# Patient Record
Sex: MALE | Race: WHITE | NOT HISPANIC OR LATINO | Employment: FULL TIME | URBAN - METROPOLITAN AREA
[De-identification: names, ages, dates, MRNs, and addresses within clinical notes are randomized per-mention and may not be internally consistent; named-entity substitution may affect disease eponyms.]

---

## 2019-05-17 ENCOUNTER — HOSPITAL ENCOUNTER (INPATIENT)
Facility: HOSPITAL | Age: 44
LOS: 5 days | Discharge: HOME | DRG: 309 | End: 2019-05-22
Attending: EMERGENCY MEDICINE | Admitting: INTERNAL MEDICINE
Payer: COMMERCIAL

## 2019-05-17 DIAGNOSIS — I48.91 ATRIAL FIBRILLATION WITH RVR: Primary | ICD-10-CM

## 2019-05-17 DIAGNOSIS — Z91.199 PATIENT'S NONCOMPLIANCE WITH OTHER MEDICAL TREATMENT AND REGIMEN: ICD-10-CM

## 2019-05-17 DIAGNOSIS — I48.91 A-FIB: ICD-10-CM

## 2019-05-17 PROBLEM — I10 ESSENTIAL HYPERTENSION: Status: ACTIVE | Noted: 2019-05-17

## 2019-05-17 PROBLEM — E66.01 MORBID OBESITY: Status: ACTIVE | Noted: 2019-05-17

## 2019-05-17 PROBLEM — E03.9 HYPOTHYROIDISM: Status: ACTIVE | Noted: 2019-05-17

## 2019-05-17 LAB
ANION GAP SERPL CALC-SCNC: 14 MMOL/L (ref 8–16)
BASOPHILS # BLD AUTO: 0 K/UL (ref 0–0.2)
BASOPHILS NFR BLD: 0.4 % (ref 0–1.9)
BUN SERPL-MCNC: 13 MG/DL (ref 6–20)
CALCIUM SERPL-MCNC: 9.4 MG/DL (ref 8.7–10.5)
CHLORIDE SERPL-SCNC: 101 MMOL/L (ref 95–110)
CK MB SERPL-MCNC: 0.9 NG/ML (ref 0.1–6.5)
CK MB SERPL-MCNC: 1 NG/ML (ref 0.1–6.5)
CK MB SERPL-RTO: 1 % (ref 0–5)
CK MB SERPL-RTO: 1.1 % (ref 0–5)
CK SERPL-CCNC: 91 U/L (ref 20–200)
CK SERPL-CCNC: 91 U/L (ref 20–200)
CO2 SERPL-SCNC: 24 MMOL/L (ref 23–29)
CREAT SERPL-MCNC: 0.9 MG/DL (ref 0.5–1.4)
D DIMER PPP IA.FEU-MCNC: 2.93 MG/L FEU
DIFFERENTIAL METHOD: ABNORMAL
EOSINOPHIL # BLD AUTO: 0.1 K/UL (ref 0–0.5)
EOSINOPHIL NFR BLD: 0.6 % (ref 0–8)
ERYTHROCYTE [DISTWIDTH] IN BLOOD BY AUTOMATED COUNT: 13.4 % (ref 11.5–14.5)
EST. GFR  (AFRICAN AMERICAN): >60 ML/MIN/1.73 M^2
EST. GFR  (NON AFRICAN AMERICAN): >60 ML/MIN/1.73 M^2
GLUCOSE SERPL-MCNC: 150 MG/DL (ref 70–110)
HCT VFR BLD AUTO: 46.3 % (ref 40–54)
HGB BLD-MCNC: 15.1 G/DL (ref 14–18)
LACTATE SERPL-SCNC: 1.7 MMOL/L (ref 0.5–2.2)
LYMPHOCYTES # BLD AUTO: 1.6 K/UL (ref 1–4.8)
LYMPHOCYTES NFR BLD: 13.4 % (ref 18–48)
MCH RBC QN AUTO: 31 PG (ref 27–31)
MCHC RBC AUTO-ENTMCNC: 32.7 G/DL (ref 32–36)
MCV RBC AUTO: 95 FL (ref 82–98)
MONOCYTES # BLD AUTO: 1.3 K/UL (ref 0.3–1)
MONOCYTES NFR BLD: 10.4 % (ref 4–15)
NEUTROPHILS # BLD AUTO: 9.2 K/UL (ref 1.8–7.7)
NEUTROPHILS NFR BLD: 75.2 % (ref 38–73)
PLATELET # BLD AUTO: 332 K/UL (ref 150–350)
PMV BLD AUTO: 9.1 FL (ref 9.2–12.9)
POTASSIUM SERPL-SCNC: 3.9 MMOL/L (ref 3.5–5.1)
RBC # BLD AUTO: 4.88 M/UL (ref 4.6–6.2)
SODIUM SERPL-SCNC: 139 MMOL/L (ref 136–145)
T4 FREE SERPL-MCNC: 0.8 NG/DL (ref 0.71–1.51)
TROPONIN I SERPL DL<=0.01 NG/ML-MCNC: 0.01 NG/ML (ref 0–0.03)
TROPONIN I SERPL DL<=0.01 NG/ML-MCNC: 0.02 NG/ML (ref 0–0.03)
TROPONIN I SERPL DL<=0.01 NG/ML-MCNC: 0.02 NG/ML (ref 0–0.03)
TSH SERPL DL<=0.005 MIU/L-ACNC: 23.74 UIU/ML (ref 0.4–4)
WBC # BLD AUTO: 12.3 K/UL (ref 3.9–12.7)

## 2019-05-17 PROCEDURE — 82553 CREATINE MB FRACTION: CPT

## 2019-05-17 PROCEDURE — 83605 ASSAY OF LACTIC ACID: CPT

## 2019-05-17 PROCEDURE — 25000003 PHARM REV CODE 250: Performed by: INTERNAL MEDICINE

## 2019-05-17 PROCEDURE — 84439 ASSAY OF FREE THYROXINE: CPT

## 2019-05-17 PROCEDURE — 96376 TX/PRO/DX INJ SAME DRUG ADON: CPT

## 2019-05-17 PROCEDURE — 36415 COLL VENOUS BLD VENIPUNCTURE: CPT

## 2019-05-17 PROCEDURE — 96365 THER/PROPH/DIAG IV INF INIT: CPT

## 2019-05-17 PROCEDURE — 82550 ASSAY OF CK (CPK): CPT | Mod: 91

## 2019-05-17 PROCEDURE — 84443 ASSAY THYROID STIM HORMONE: CPT

## 2019-05-17 PROCEDURE — 63600175 PHARM REV CODE 636 W HCPCS: Performed by: EMERGENCY MEDICINE

## 2019-05-17 PROCEDURE — 93005 ELECTROCARDIOGRAM TRACING: CPT

## 2019-05-17 PROCEDURE — 25500020 PHARM REV CODE 255: Performed by: EMERGENCY MEDICINE

## 2019-05-17 PROCEDURE — 85025 COMPLETE CBC W/AUTO DIFF WBC: CPT

## 2019-05-17 PROCEDURE — 84484 ASSAY OF TROPONIN QUANT: CPT | Mod: 91

## 2019-05-17 PROCEDURE — 96372 THER/PROPH/DIAG INJ SC/IM: CPT | Mod: 59

## 2019-05-17 PROCEDURE — 11000001 HC ACUTE MED/SURG PRIVATE ROOM

## 2019-05-17 PROCEDURE — 25000003 PHARM REV CODE 250: Performed by: EMERGENCY MEDICINE

## 2019-05-17 PROCEDURE — 99285 EMERGENCY DEPT VISIT HI MDM: CPT | Mod: 25

## 2019-05-17 PROCEDURE — 84484 ASSAY OF TROPONIN QUANT: CPT

## 2019-05-17 PROCEDURE — 85379 FIBRIN DEGRADATION QUANT: CPT

## 2019-05-17 PROCEDURE — 80048 BASIC METABOLIC PNL TOTAL CA: CPT

## 2019-05-17 PROCEDURE — 96366 THER/PROPH/DIAG IV INF ADDON: CPT

## 2019-05-17 RX ORDER — DILTIAZEM HCL 1 MG/ML
10 INJECTION, SOLUTION INTRAVENOUS CONTINUOUS
Status: DISCONTINUED | OUTPATIENT
Start: 2019-05-17 | End: 2019-05-17

## 2019-05-17 RX ORDER — CARVEDILOL 3.12 MG/1
3.12 TABLET ORAL 2 TIMES DAILY
Status: DISCONTINUED | OUTPATIENT
Start: 2019-05-17 | End: 2019-05-19

## 2019-05-17 RX ORDER — DILTIAZEM HYDROCHLORIDE 5 MG/ML
10 INJECTION INTRAVENOUS
Status: COMPLETED | OUTPATIENT
Start: 2019-05-17 | End: 2019-05-17

## 2019-05-17 RX ORDER — LEVOTHYROXINE SODIUM 25 UG/1
25 TABLET ORAL
Status: DISCONTINUED | OUTPATIENT
Start: 2019-05-18 | End: 2019-05-22 | Stop reason: HOSPADM

## 2019-05-17 RX ORDER — ENOXAPARIN SODIUM 150 MG/ML
120 INJECTION SUBCUTANEOUS
Status: COMPLETED | OUTPATIENT
Start: 2019-05-17 | End: 2019-05-17

## 2019-05-17 RX ORDER — SODIUM CHLORIDE 0.9 % (FLUSH) 0.9 %
10 SYRINGE (ML) INJECTION
Status: DISCONTINUED | OUTPATIENT
Start: 2019-05-17 | End: 2019-05-22 | Stop reason: HOSPADM

## 2019-05-17 RX ORDER — ENOXAPARIN SODIUM 150 MG/ML
1 INJECTION SUBCUTANEOUS
Status: DISCONTINUED | OUTPATIENT
Start: 2019-05-17 | End: 2019-05-17

## 2019-05-17 RX ORDER — DILTIAZEM HCL/D5W 125 MG/125
10 PLASTIC BAG, INJECTION (ML) INTRAVENOUS CONTINUOUS
Status: DISCONTINUED | OUTPATIENT
Start: 2019-05-17 | End: 2019-05-18

## 2019-05-17 RX ORDER — ENOXAPARIN SODIUM 150 MG/ML
1 INJECTION SUBCUTANEOUS
Status: DISCONTINUED | OUTPATIENT
Start: 2019-05-18 | End: 2019-05-18

## 2019-05-17 RX ADMIN — SODIUM CHLORIDE, SODIUM LACTATE, POTASSIUM CHLORIDE, AND CALCIUM CHLORIDE 1000 ML: .6; .31; .03; .02 INJECTION, SOLUTION INTRAVENOUS at 02:05

## 2019-05-17 RX ADMIN — DILTIAZEM HYDROCHLORIDE 10 MG: 5 INJECTION INTRAVENOUS at 02:05

## 2019-05-17 RX ADMIN — ENOXAPARIN SODIUM 120 MG: 150 INJECTION SUBCUTANEOUS at 04:05

## 2019-05-17 RX ADMIN — IOHEXOL 100 ML: 350 INJECTION, SOLUTION INTRAVENOUS at 02:05

## 2019-05-17 RX ADMIN — DILTIAZEM HYDROCHLORIDE 10 MG/HR: 5 INJECTION INTRAVENOUS at 02:05

## 2019-05-17 RX ADMIN — CARVEDILOL 3.12 MG: 3.12 TABLET, FILM COATED ORAL at 08:05

## 2019-05-17 NOTE — ED PROVIDER NOTES
"Encounter Date: 5/17/2019    SCRIBE #1 NOTE: I, Shiloh Clifford, am scribing for, and in the presence of, Dr. Elmer Poon.       History     Chief Complaint   Patient presents with    Palpitations       Time seen by provider: 12:39 PM on 05/17/2019    Bernard Tinajero is a 43 y.o. male with PMHx of HTN and hypothyroidism who presents to the ED with complaints of palpitations that started last night. Patient mentions currently having increased anxiety. He reports having a cough that is non productive and also started last night. He was prompted to visit the ED due to concerns of possible PNA when "hearing a popping noise when I exhaled". The patient reports having a fever and chills last night but denies measuring for a fever. He is on HTN medications but is non compliant. He complains of chronic left leg swelling and notes having cellulitis of the left lower leg in the past. He also notes having a chronic rash for the past x2 years on his left lower leg and has dermatology in the past for it. He is currently using an antifungal essential oils cream for the rash. The patient denies recent surgeries or prolonged travelling trips. He denies blood or dark stool, blood in the urine, chest pain, SOB, or onset of any other new symptoms currently. No SHx on file. Ampicillin allergy noted.     The history is provided by the patient.     Review of patient's allergies indicates:   Allergen Reactions    Ampicillin      No past medical history on file.  No past surgical history on file.  No family history on file.  Social History     Tobacco Use    Smoking status: Not on file   Substance Use Topics    Alcohol use: Not on file    Drug use: Not on file     Review of Systems   Constitutional: Positive for chills and fever (subjective). Negative for activity change.   HENT: Negative for congestion and rhinorrhea.    Respiratory: Positive for cough (nonproductive).    Cardiovascular: Positive for palpitations and leg swelling " (left leg; chronic). Negative for chest pain.   Gastrointestinal: Negative for abdominal pain, constipation, diarrhea, nausea and vomiting.   Genitourinary: Negative for difficulty urinating and hematuria.   Musculoskeletal: Negative for arthralgias, gait problem and myalgias.   Skin: Positive for rash (LLE). Negative for pallor.   Neurological: Negative for dizziness, syncope, weakness, light-headedness and headaches.   Hematological: Does not bruise/bleed easily.   Psychiatric/Behavioral: The patient is nervous/anxious.        Physical Exam     Initial Vitals [05/17/19 1222]   BP Pulse Resp Temp SpO2   122/81 (!) 168 16 99.3 °F (37.4 °C) 99 %      MAP       --         Physical Exam    Nursing note and vitals reviewed.  Constitutional: He appears well-developed and well-nourished. He is not diaphoretic. No distress.   HENT:   Head: Normocephalic and atraumatic.   Mouth/Throat: Oropharynx is clear and moist.   Eyes: Conjunctivae are normal.   Neck: Neck supple.   Cardiovascular: Normal heart sounds and intact distal pulses. An irregular rhythm present.  Tachycardia present.  Exam reveals no gallop and no friction rub.    No murmur heard.  Tachycardic rate with an irregular rhythm. No murmurs, rubs, or gallops noted.    Pulmonary/Chest: Breath sounds normal. No accessory muscle usage. No respiratory distress. He has no wheezes. He has no rhonchi. He has no rales.   Equal, bilateral breath sounds noted without wheezing.    Abdominal: Soft. He exhibits no distension. There is no tenderness.   Musculoskeletal: Normal range of motion.   No trace pitting edema. BLE are symmetrical. Non tender plaque to the medial left lower extremity with superficial skin erosion. No petechia or purpura noted.    Neurological: He is alert and oriented to person, place, and time.   Skin: No petechiae and no purpura noted. No erythema.         ED Course   Critical Care  Date/Time: 5/17/2019 8:36 PM  Performed by: Elmer Poon,  MD  Authorized by: Rika Lamb MD   Direct patient critical care time: 15 minutes  Additional history critical care time: 2 minutes  Ordering / reviewing critical care time: 5 minutes  Documentation critical care time: 6 minutes  Consulting other physicians critical care time: 3 minutes  Total critical care time (exclusive of procedural time) : 31 minutes        Labs Reviewed   CBC W/ AUTO DIFFERENTIAL - Abnormal; Notable for the following components:       Result Value    MPV 9.1 (*)     Gran # (ANC) 9.2 (*)     Mono # 1.3 (*)     Gran% 75.2 (*)     Lymph% 13.4 (*)     All other components within normal limits   BASIC METABOLIC PANEL - Abnormal; Notable for the following components:    Glucose 150 (*)     All other components within normal limits   D DIMER, QUANTITATIVE - Abnormal; Notable for the following components:    D-Dimer 2.93 (*)     All other components within normal limits   TSH - Abnormal; Notable for the following components:    TSH 23.739 (*)     All other components within normal limits   LACTIC ACID, PLASMA   TROPONIN I   T4, FREE          Imaging Results          CTA Chest Non-Coronary (Final result)  Result time 05/17/19 14:59:37    Final result by Jose Manuel Jerome MD (05/17/19 14:59:37)                 Impression:      Cardiomegaly and features of mild pulmonary edema.  Small bilateral pleural effusions.    Mild nonspecific mediastinal and left hilar lymphadenopathy.    Partially calcified nodule within the lingula.  This may represent a calcifying granuloma however follow-up in 3-6 months is recommended due to the size and soft tissue component.      Electronically signed by: Jose Manuel Jerome MD  Date:    05/17/2019  Time:    14:59             Narrative:    EXAMINATION:  CTA CHEST NON CORONARY    CLINICAL HISTORY:  Chest pain, acute, PE suspected, intermed prob, positive D-dimer;    TECHNIQUE:  Low dose axial images, sagittal and coronal reformations were obtained from the thoracic inlet  to the lung bases following the IV administration of 100 mL of Omnipaque 350.  Contrast timing was optimized to evaluate the pulmonary arteries.  MIP images were performed.    COMPARISON:  None    FINDINGS:  There are no pulmonary arterial filling defects to indicate the presence of pulmonary thromboemboli.    The heart size is mildly enlarged.  There is mild mediastinal and left hilar lymphadenopathy.  This is manifest predominantly as an abundance of nonenlarged mediastinal lymph nodes however also with a few frankly enlarged nodes including a 13 mm short axis AP window lymph node and a 12 mm short axis left hilar lymph node.    There are small bilateral pleural effusions.  Geographic ground-glass changes are present most notably at the lung bases.  These are accompanied by mild septal thickening.  There is no consolidation.    There is a 1.8 cm nodule within the lingula containing a central coarse calcification.                               X-Ray Chest 1 View (Final result)  Result time 05/17/19 13:17:34    Final result by Lydia Naqvi MD (05/17/19 13:17:34)                 Impression:      Mildly prominent infrahilar markings on the right which could represent mild strandy infiltrate or atelectasis without confluent infiltrate.      Electronically signed by: Lydia Naqvi MD  Date:    05/17/2019  Time:    13:17             Narrative:    EXAMINATION:  XR CHEST 1 VIEW    CLINICAL HISTORY:  Pleuritic back pain, r/o pneumonia;    TECHNIQUE:  Single frontal view of the chest was performed.    COMPARISON:  None    FINDINGS:  The heart appears borderline to mildly enlarged for the projection.  There are mildly prominent markings right infrahilar.  There is no consolidation.  There is no pleural effusion.    There are no prior studies for comparison                            (radiology reading, CXR visualized by me)    The patient was informed of the incidental finding(s) as well as the need for PCP or  specialist follow-up for reevaluation and possible further investigation or monitoring.     Medical Decision Making:   History:   Old Medical Records: I decided to obtain old medical records.  Clinical Tests:   Lab Tests: Reviewed and Ordered  Radiological Study: Reviewed and Ordered  Medical Tests: Reviewed and Ordered  Other:   I have discussed this case with another health care provider.            Scribe Attestation:   Scribe #1: I performed the above scribed service and the documentation accurately describes the services I performed. I attest to the accuracy of the note.      I, Dr. Elmer Poon, personally performed the services described in this documentation. All medical record entries made by the scribe were at my direction and in my presence.  I have reviewed the chart and agree that the record reflects my personal performance and is accurate and complete. Elmer Poon MD.  8:34 PM 05/17/2019    Bernard Tinajero is a 43 y.o. male presenting with  Tachycardia with atrial fibrillation with rapid ventricular response.  This responded well to IV diltiazem bolus and infusion with moderation of heart rate.  He has had no respiratory distress or other issues here.  He did have some vague pleuritic back pain with workup ultimately negative for acute process such as pneumonia or PE based on CT.  I do not think antibiotics are indicated.  I doubt sepsis.  Lactic acid is normal.  He does have mild edema visible primarily on CT.  Further IV fluids were discontinued initiated due to IV dye administration.  Further workup for possible associated mild CHF to be considered.  I have discussed with hospital medicine will assume care.  Anticoagulation initiated in consideration of the primary AFib as requested by Dr. Lamb.            ED Course as of May 17 1518   Fri May 17, 2019   1250 EKG:  Atrial fibrillation with RVR, rate of 187, normal intervals and axis.  There are no acute ST or T wave changes suggestive  of acute ischemia or infarction.      [MR]   1433 HR improved to 120s on diltiazem bolus followed by gtt ongoing.    [MR]      ED Course User Index  [MR] Elmer Poon MD     Clinical Impression:       ICD-10-CM ICD-9-CM   1. Atrial fibrillation with RVR I48.91 427.31         Disposition:   Disposition: Admitted                        Elmer Poon MD  05/17/19 2037

## 2019-05-17 NOTE — ASSESSMENT & PLAN NOTE
Body mass index is 46.05 kg/m². Morbid obesity complicates all aspects of disease management from diagnostic modalities to treatment. Weight loss encouraged and health benefits explained to patient.

## 2019-05-17 NOTE — ASSESSMENT & PLAN NOTE
Chronic, controlled.  Latest blood pressure and vitals reviewed-   Temp:  [96 °F (35.6 °C)-98.4 °F (36.9 °C)]   Pulse:  []   Resp:  [16-20]   BP: ()/(56-95)   SpO2:  [96 %-98 %] .   Current meds for hypertension include   Carvedilol, diltiazem.  Will continue BP medications as needed for sustained BP control.

## 2019-05-17 NOTE — ASSESSMENT & PLAN NOTE
Patient admits prior history of hypothyroidism but never took the medication.  Start Synthroid at lower dose 25 mcg daily.  Avoiding higher does due to atrial fibrillation with rapid ventricular response.

## 2019-05-17 NOTE — ASSESSMENT & PLAN NOTE
Atrial Fibrillation controlled currently with Beta Blocker and Calcium Channel Blocker. [id number] score 1. Anticoagulation indicated currently. Anticoagulation done with   Aspirin given the patient's low chads 2 score. Will transition to IV Cardizem drip to p.o. Cardizem / beta-blocker and monitor.  Cardiology consulted, will defer to them for further management.

## 2019-05-17 NOTE — H&P
PCP: Primary Doctor No    History & Physical    Chief Complaint:  Palpitations for 1 day    History of Present Illness:  Patient is a 43 y.o. male admitted to Hospitalist Service from [facility] Room with complaint of palpitations for 1 day. Patient reportedly has past medical history significant for morbid obesity, hypertension, hypothyroidism, chronic left leg swelling with history of leg cellulitis/dermatitis and medical noncompliance.  Patient presented to the emergency room with complaint of palpitations since previous night.  Patient reports associated nonproductive cough with increased anxiety.  Patient is worried about possibility of pneumonia.  Patient is describing frequent wheezing and reported subjective fever for 1 day as well.  Patient denied any orthopnea or paroxysmal nocturnal dyspnea.  No calf tenderness reported.  Patient denied any use of illicit substances.  No recent travel history or sick contact reported. Patient denied chest pain, abdominal pain, nausea, vomiting, headache, vision changes, focal neuro-deficits, cough or fever.  In the emergency room patient is noted to have new onset atrial fibrillation with rapid ventricular response around 120 beats per minute for which patient has been started on Cardizem infusion at 10 milligram/hour after receiving a bolus of 10 mg Cardizem injection.     Past medical history:  Morbid obesity, chronic left leg swelling, hypothyroidism, hypertension  No past surgical history on file.     Family history:  Denies family history of heart disease or cerebrovascular accident.    Social history:  , retired [employer], admits drinking alcohol 3 pt weekly.  Denies cigarette smoking.    Review of patient's allergies indicates:   Allergen Reactions    Ampicillin        (Not in a hospital admission)  Review of Systems:  Constitutional: no fever or chills  Eyes: no visual changes  Ears, nose, mouth, throat, and face: no nasal  congestion or sore throat  Respiratory: no cough or shorness of breath  Cardiovascular: no chest pain or palpitations  Gastrointestinal: no nausea or vomiting, no abdominal pain or change in bowel habits  Genitourinary: no hematuria or dysuria  Integument/breast: no rash or pruritis  Hematologic/lymphatic: no easy bruising or lymphadenopathy  Musculoskeletal: no arthralgias or myalgias  Neurological: no seizures or tremors.  Behavioral/Psych: no auditory or visual hallucinations  Endocrine: no heat or cold intolerance     OBJECTIVE:     Vital Signs (Most Recent)  Temp: 99.3 °F (37.4 °C) (05/17/19 1222)  Pulse: (!) 134 (05/17/19 1524)  Resp: 16 (05/17/19 1222)  BP: (!) 147/100 (05/17/19 1502)  SpO2: 97 % (05/17/19 1524)    Physical Exam:  General appearance: well developed, appears stated age, morbidly obese male, pleasant  Head: normocephalic, atraumatic  Eyes:  conjunctivae/corneas clear. PERRL.  Nose: Nares normal. Septum midline.  Throat: lips, mucosa, and tongue normal; teeth and gums normal, no throat erythema.  Neck: supple, symmetrical, trachea midline, no JVD and thyroid not enlarged, symmetric, no tenderness/mass/nodules  Lungs:  clear to auscultation bilaterally and normal respiratory effort  Chest wall: no tenderness  Heart:  Tachycardia, irregular, S1, S2 normal, no murmur, click, rub or gallop  Abdomen: soft, non-tender non-distended; bowel sounds normal; no masses,  no organomegaly  Extremities: no cyanosis, clubbing or edema. Bilateral Tamica sign negative.  Left lower extremity has superficial skin erosions and plaque formation on the medial aspect of the left leg.  Pulses: 2+ and symmetric  Skin: Skin color, texture, turgor normal. No rashes or lesions.  Lymph nodes: Cervical, supraclavicular, and axillary nodes normal.  Neurologic: Normal strength and tone. No focal numbness or weakness. CNII-XII intact.      Laboratory:   CBC:   Recent Labs   Lab 05/17/19  1235   WBC 12.30   RBC 4.88   HGB 15.1    HCT 46.3      MCV 95   MCH 31.0   MCHC 32.7     CMP:   Recent Labs   Lab 05/17/19  1235   *   CALCIUM 9.4      K 3.9   CO2 24      BUN 13   CREATININE 0.9   Cardiac markers:   Recent Labs   Lab 05/17/19  1235   TROPONINI 0.014   D-dimer: 2.93    TSH: 23.7  Free T4: 0.80    Diagnostic Results:  Chest X-Ray: Mildly prominent infrahilar markings on the right which could represent mild strandy infiltrate or atelectasis without confluent infiltrate.    CTA chest non-coronary study:  Cardiomegaly and features of mild pulmonary edema.  Small bilateral pleural effusions. Mild nonspecific mediastinal and left hilar lymphadenopathy. Partially calcified nodule within the lingula.  This may represent a calcifying granuloma however follow-up in 3-6 months is recommended due to the size and soft tissue component.    ECHO: Not available in our system.    Assessment/Plan:     Active Hospital Problems    Diagnosis  POA    *Atrial fibrillation with RVR [I48.91]  Supplemental O2 via nasal canula; titrate O2 saturation to >92%.  Serial Cardiac enzymes × 3.  Obtain 2D echocardiogram for evaluation of LV systolic function, valvular heart disease or intracardiac thrombus formation.  Continue Cardizem infusion to regulate heart rate.  Lovenox 1 milligram/kilogram subcu q.12 hours  Tele-monitoring.   Cardiology Consultation.  Check fasting lipid panel and EKG in AM.  Use Nitroglycerine PRN Chest pain.  Na restriction <2g/d.    Alcohol abuse  Patient counseled regarding abstinence from alcohol drinking especially in the setting of atrial fibrillation with rapid ventricular response.  Patient voiced understanding.   to provide alcohol rehab resources    Yes    Morbid obesity [E66.01]  Body mass index is 46.05 kg/m². Morbid obesity complicates all aspects of disease management from diagnostic modalities to treatment. Weight loss encouraged and health benefits explained to patient.    Yes     Hypothyroidism [E03.9]  Patient admits prior history of hypothyroidism but never took the medication.  Start Synthroid at lower dose 25 mcg daily.  Avoiding higher does due to atrial fibrillation with rapid ventricular response.    Yes    Patient's noncompliance with other medical treatment and regimen [Z91.19]  Medical compliance counseling performed.    Not Applicable    Essential hypertension [I10]  Start Coreg 3.125 mg twice daily.  Monitor BP closley.  Unknown     DVT prophylaxis:  Lovenox 1 milligram/kilogram subcu q.12 hours.    Discussed with patient's wife.  Answered all questions.    Rika Lamb MD  Department of Hospital Medicine   [facility]

## 2019-05-18 LAB
ALBUMIN SERPL BCP-MCNC: 3.4 G/DL (ref 3.5–5.2)
ALP SERPL-CCNC: 57 U/L (ref 55–135)
ALT SERPL W/O P-5'-P-CCNC: 37 U/L (ref 10–44)
ANION GAP SERPL CALC-SCNC: 7 MMOL/L (ref 8–16)
AORTIC ROOT ANNULUS: 2.72 CM
AORTIC VALVE CUSP SEPERATION: 2.06 CM
AST SERPL-CCNC: 29 U/L (ref 10–40)
AV INDEX (PROSTH): 0.87
AV MEAN GRADIENT: 3.59 MMHG
AV PEAK GRADIENT: 5.95 MMHG
AV VALVE AREA: 3.42 CM2
AV VELOCITY RATIO: 0.81
BASOPHILS # BLD AUTO: 0 K/UL (ref 0–0.2)
BASOPHILS NFR BLD: 0.4 % (ref 0–1.9)
BILIRUB SERPL-MCNC: 1.3 MG/DL (ref 0.1–1)
BSA FOR ECHO PROCEDURE: 2.51 M2
BUN SERPL-MCNC: 10 MG/DL (ref 6–20)
CALCIUM SERPL-MCNC: 9.3 MG/DL (ref 8.7–10.5)
CHLORIDE SERPL-SCNC: 103 MMOL/L (ref 95–110)
CHOLEST SERPL-MCNC: 184 MG/DL (ref 120–199)
CHOLEST/HDLC SERPL: 6.8 {RATIO} (ref 2–5)
CK MB SERPL-MCNC: 0.7 NG/ML (ref 0.1–6.5)
CK MB SERPL-RTO: 0.9 % (ref 0–5)
CK SERPL-CCNC: 80 U/L (ref 20–200)
CO2 SERPL-SCNC: 29 MMOL/L (ref 23–29)
CREAT SERPL-MCNC: 0.9 MG/DL (ref 0.5–1.4)
CV ECHO LV RWT: 0.27 CM
DIFFERENTIAL METHOD: ABNORMAL
DOP CALC AO PEAK VEL: 1.22 M/S
DOP CALC AO VTI: 20.39 CM
DOP CALC LVOT AREA: 3.94 CM2
DOP CALC LVOT DIAMETER: 2.24 CM
DOP CALC LVOT PEAK VEL: 0.99 M/S
DOP CALC LVOT STROKE VOLUME: 69.72 CM3
DOP CALCLVOT PEAK VEL VTI: 17.7 CM
E WAVE DECELERATION TIME: 161.58 MSEC
ECHO LV POSTERIOR WALL: 0.82 CM (ref 0.6–1.1)
EOSINOPHIL # BLD AUTO: 0.3 K/UL (ref 0–0.5)
EOSINOPHIL NFR BLD: 3.3 % (ref 0–8)
ERYTHROCYTE [DISTWIDTH] IN BLOOD BY AUTOMATED COUNT: 13.4 % (ref 11.5–14.5)
EST. GFR  (AFRICAN AMERICAN): >60 ML/MIN/1.73 M^2
EST. GFR  (NON AFRICAN AMERICAN): >60 ML/MIN/1.73 M^2
FRACTIONAL SHORTENING: 29 % (ref 28–44)
GLUCOSE SERPL-MCNC: 120 MG/DL (ref 70–110)
HCT VFR BLD AUTO: 41.5 % (ref 40–54)
HDLC SERPL-MCNC: 27 MG/DL (ref 40–75)
HDLC SERPL: 14.7 % (ref 20–50)
HGB BLD-MCNC: 13.8 G/DL (ref 14–18)
INTERVENTRICULAR SEPTUM: 0.82 CM (ref 0.6–1.1)
IVRT: 0.11 MSEC
LDLC SERPL CALC-MCNC: 133 MG/DL (ref 63–159)
LEFT ATRIUM SIZE: 4.7 CM
LEFT INTERNAL DIMENSION IN SYSTOLE: 4.29 CM (ref 2.1–4)
LEFT VENTRICLE DIASTOLIC VOLUME INDEX: 77.27 ML/M2
LEFT VENTRICLE DIASTOLIC VOLUME: 184.04 ML
LEFT VENTRICLE MASS INDEX: 82 G/M2
LEFT VENTRICLE SYSTOLIC VOLUME INDEX: 34.6 ML/M2
LEFT VENTRICLE SYSTOLIC VOLUME: 82.45 ML
LEFT VENTRICULAR INTERNAL DIMENSION IN DIASTOLE: 6.06 CM (ref 3.5–6)
LEFT VENTRICULAR MASS: 195.28 G
LYMPHOCYTES # BLD AUTO: 2 K/UL (ref 1–4.8)
LYMPHOCYTES NFR BLD: 19.1 % (ref 18–48)
MAGNESIUM SERPL-MCNC: 2.2 MG/DL (ref 1.6–2.6)
MCH RBC QN AUTO: 31.4 PG (ref 27–31)
MCHC RBC AUTO-ENTMCNC: 33.2 G/DL (ref 32–36)
MCV RBC AUTO: 95 FL (ref 82–98)
MONOCYTES # BLD AUTO: 1 K/UL (ref 0.3–1)
MONOCYTES NFR BLD: 9.9 % (ref 4–15)
NEUTROPHILS # BLD AUTO: 7.1 K/UL (ref 1.8–7.7)
NEUTROPHILS NFR BLD: 67.3 % (ref 38–73)
NONHDLC SERPL-MCNC: 157 MG/DL
PHOSPHATE SERPL-MCNC: 3.2 MG/DL (ref 2.7–4.5)
PISA TR MAX VEL: 2.05 M/S
PLATELET # BLD AUTO: 269 K/UL (ref 150–350)
PMV BLD AUTO: 8.4 FL (ref 9.2–12.9)
POTASSIUM SERPL-SCNC: 4.2 MMOL/L (ref 3.5–5.1)
PROT SERPL-MCNC: 7.2 G/DL (ref 6–8.4)
PV PEAK VELOCITY: 0.61 CM/S
RA PRESSURE: 15 MMHG
RBC # BLD AUTO: 4.39 M/UL (ref 4.6–6.2)
RIGHT VENTRICULAR END-DIASTOLIC DIMENSION: 3.45 CM
SODIUM SERPL-SCNC: 139 MMOL/L (ref 136–145)
TDI LATERAL: 0.16
TDI SEPTAL: 0.13
TDI: 0.15
TR MAX PG: 16.81 MMHG
TRICUSPID ANNULAR PLANE SYSTOLIC EXCURSION: 2.13 CM
TRIGL SERPL-MCNC: 120 MG/DL (ref 30–150)
TROPONIN I SERPL DL<=0.01 NG/ML-MCNC: 0.02 NG/ML (ref 0–0.03)
TROPONIN I SERPL DL<=0.01 NG/ML-MCNC: 0.02 NG/ML (ref 0–0.03)
TV REST PULMONARY ARTERY PRESSURE: 32 MMHG
WBC # BLD AUTO: 10.5 K/UL (ref 3.9–12.7)

## 2019-05-18 PROCEDURE — 80053 COMPREHEN METABOLIC PANEL: CPT

## 2019-05-18 PROCEDURE — 63600175 PHARM REV CODE 636 W HCPCS: Performed by: SPECIALIST

## 2019-05-18 PROCEDURE — 85025 COMPLETE CBC W/AUTO DIFF WBC: CPT

## 2019-05-18 PROCEDURE — 82550 ASSAY OF CK (CPK): CPT

## 2019-05-18 PROCEDURE — 25000003 PHARM REV CODE 250: Performed by: HOSPITALIST

## 2019-05-18 PROCEDURE — 25000003 PHARM REV CODE 250: Performed by: INTERNAL MEDICINE

## 2019-05-18 PROCEDURE — 83735 ASSAY OF MAGNESIUM: CPT

## 2019-05-18 PROCEDURE — 36415 COLL VENOUS BLD VENIPUNCTURE: CPT

## 2019-05-18 PROCEDURE — 11000001 HC ACUTE MED/SURG PRIVATE ROOM

## 2019-05-18 PROCEDURE — 80061 LIPID PANEL: CPT

## 2019-05-18 PROCEDURE — 84100 ASSAY OF PHOSPHORUS: CPT

## 2019-05-18 PROCEDURE — 82553 CREATINE MB FRACTION: CPT

## 2019-05-18 PROCEDURE — 63600175 PHARM REV CODE 636 W HCPCS: Performed by: INTERNAL MEDICINE

## 2019-05-18 PROCEDURE — 84484 ASSAY OF TROPONIN QUANT: CPT

## 2019-05-18 RX ORDER — FUROSEMIDE 40 MG/1
40 TABLET ORAL ONCE
Status: COMPLETED | OUTPATIENT
Start: 2019-05-19 | End: 2019-05-19

## 2019-05-18 RX ORDER — DILTIAZEM HYDROCHLORIDE 30 MG/1
30 TABLET, FILM COATED ORAL EVERY 6 HOURS
Status: DISCONTINUED | OUTPATIENT
Start: 2019-05-18 | End: 2019-05-19

## 2019-05-18 RX ORDER — ASPIRIN 325 MG
325 TABLET ORAL DAILY
Status: DISCONTINUED | OUTPATIENT
Start: 2019-05-18 | End: 2019-05-22 | Stop reason: HOSPADM

## 2019-05-18 RX ORDER — ENOXAPARIN SODIUM 150 MG/ML
1 INJECTION SUBCUTANEOUS EVERY 12 HOURS
Status: DISCONTINUED | OUTPATIENT
Start: 2019-05-18 | End: 2019-05-20

## 2019-05-18 RX ADMIN — ENOXAPARIN SODIUM 130 MG: 150 INJECTION SUBCUTANEOUS at 06:05

## 2019-05-18 RX ADMIN — ASPIRIN 325 MG ORAL TABLET 325 MG: 325 PILL ORAL at 02:05

## 2019-05-18 RX ADMIN — ENOXAPARIN SODIUM 130 MG: 150 INJECTION SUBCUTANEOUS at 09:05

## 2019-05-18 RX ADMIN — LEVOTHYROXINE SODIUM 25 MCG: 25 TABLET ORAL at 06:05

## 2019-05-18 RX ADMIN — CARVEDILOL 3.12 MG: 3.12 TABLET, FILM COATED ORAL at 08:05

## 2019-05-18 RX ADMIN — DILTIAZEM HYDROCHLORIDE 30 MG: 30 TABLET, FILM COATED ORAL at 02:05

## 2019-05-18 RX ADMIN — CARVEDILOL 3.12 MG: 3.12 TABLET, FILM COATED ORAL at 09:05

## 2019-05-18 NOTE — PLAN OF CARE
Pt resting quietly at this time. Able to make needs known. Cont b/b/. Denies pain during this shift. Remains in afib at controlled rate. Bed low and locked. Cont b/b.

## 2019-05-18 NOTE — PLAN OF CARE
Sw met w/ pt and spouse for assessment.  Pt lives w/ spouse, independent w/ ADL.  Pt denies any health issues.  States he was at work and was advised by the med clinic at work to come to hospital.  Pt does not currently use HH or DME.  [facility]       05/18/19 1047   Discharge Assessment   Assessment Type Discharge Planning Assessment   Confirmed/corrected address and phone number on facesheet? Yes   Assessment information obtained from? Patient;Caregiver   Prior to hospitilization cognitive status: Alert/Oriented   Prior to hospitalization functional status: Independent   Current cognitive status: Alert/Oriented   Current Functional Status: Independent   Fac[facility] From: [facility]   Lives With spouse   Able to Return to Prior Arrangements yes   Is patient able to care for self after discharge? Yes   Who are your caregiver(s) and their phone number(s)? spouse:  Tati Tinajero [phone]   Patient's perception of discharge disposition home or selfcare   Readmission Within the Last 30 Days no previous admission in last 30 days   Patient currently being followed by outpatient case management? No   Patient currently receives any other outside agency services? No   Equipment Currently Used at Home none   Do you have any problems affording any of your prescribed medications? No   Does the patient have transportation home? Yes   Transportation Anticipated family or friend will provide   Does the patient receive services at the Coumadin Clinic? No   Discharge Plan A Home with family   Discharge Plan B Home with family   DME Needed Upon Discharge  none   Patient/Family in Agreement with Plan yes

## 2019-05-18 NOTE — PLAN OF CARE
Received pt to floor from ED. Telemetry placed onto pt. 's in afib. Cardizem drip currently infusing. Safety precautions reviewed with pt. Oriented pt to call light, pt verbalized understanding.

## 2019-05-18 NOTE — PROGRESS NOTES
[facility] Medicine  Progress Note    Patient Name: Bernard Tinajero  MRN: [id number]  Patient Class: IP- Inpatient   Admission Date: 5/17/2019  Length of Stay: 1 days  Attending Physician: Rich Todd MD  Primary Care Provider: Primary Doctor No        Subjective:     Principal Problem:Atrial fibrillation with RVR    Interval History:  Patient seen and examined.  Transition back to normal sinus rhythm on Cardizem drip.  Remains asymptomatic the moment.  Plan of care discussed the patient and his wife in detail.    Review of Systems   Constitutional: Negative for chills, fatigue and fever.   Respiratory: Negative for cough and shortness of breath.    Cardiovascular: Negative for chest pain and leg swelling.   Gastrointestinal: Negative for abdominal pain, nausea and vomiting.   Musculoskeletal: Negative for back pain.   Skin: Positive for rash.   Neurological: Positive for weakness.   Psychiatric/Behavioral: Negative for confusion. The patient is not nervous/anxious.    All other systems reviewed and are negative.    Objective:     Vital Signs (Most Recent):  Temp: 97.6 °F (36.4 °C) (05/18/19 1604)  Pulse: 76 (05/18/19 1604)  Resp: 20 (05/18/19 1604)  BP: 119/77 (05/18/19 1604)  SpO2: 98 % (05/18/19 1604) Vital Signs (24h Range):  Temp:  [96 °F (35.6 °C)-98.4 °F (36.9 °C)] 97.6 °F (36.4 °C)  Pulse:  [] 76  Resp:  [16-20] 20  SpO2:  [96 %-98 %] 98 %  BP: ()/(56-95) 119/77     Weight: 133.4 kg (294 lb)  Body mass index is 46.05 kg/m².    Intake/Output Summary (Last 24 hours) at 5/18/2019 1824  Last data filed at 5/18/2019 1200  Gross per 24 hour   Intake 440 ml   Output 200 ml   Net 240 ml      Physical Exam   Constitutional: He appears well-developed and well-nourished.   Obese  male in no acute distress   Eyes: Pupils are equal, round, and reactive to light. EOM are normal.   Neck: No JVD present.   Cardiovascular: Normal rate, regular rhythm, normal heart sounds and  intact distal pulses.   Pulmonary/Chest: Effort normal and breath sounds normal.   Abdominal: Soft. Bowel sounds are normal. He exhibits no distension. There is no tenderness.   Musculoskeletal: He exhibits no edema or deformity.   Lymphadenopathy:     He has no cervical adenopathy.   Skin: No rash noted. No pallor.   Noted bilateral stasis dermatitis rash on the lower extremities   Nursing note and vitals reviewed.      Significant Labs: All pertinent labs within the past 24 hours have been reviewed.    Significant Imaging: I have reviewed all pertinent imaging results/findings within the past 24 hours.    Assessment/Plan:      * Atrial fibrillation with RVR  Atrial Fibrillation controlled currently with Beta Blocker and Calcium Channel Blocker. DLFTG1ISCt score 1. Anticoagulation indicated currently. Anticoagulation done with   Aspirin given the patient's low chads 2 score. Will transition to IV Cardizem drip to p.o. Cardizem / beta-blocker and monitor.  Cardiology consulted, will defer to them for further management.         Essential hypertension  Chronic, controlled.  Latest blood pressure and vitals reviewed-   Temp:  [96 °F (35.6 °C)-98.4 °F (36.9 °C)]   Pulse:  []   Resp:  [16-20]   BP: ()/(56-95)   SpO2:  [96 %-98 %] .   Current meds for hypertension include   Carvedilol, diltiazem.  Will continue BP medications as needed for sustained BP control.          Patient's noncompliance with other medical treatment and regimen  Medical compliance counseling performed.    Hypothyroidism  Patient admits prior history of hypothyroidism but never took the medication.  Start Synthroid at lower dose 25 mcg daily.  Avoiding higher does due to atrial fibrillation with rapid ventricular response.    Morbid obesity  Body mass index is 46.05 kg/m². Morbid obesity complicates all aspects of disease management from diagnostic modalities to treatment. Weight loss encouraged and health benefits explained to  patient.            VTE Risk Mitigation (From admission, onward)    None              Rich Todd MD  Department of Hospital Medicine   [facility]

## 2019-05-18 NOTE — PLAN OF CARE
Problem: Adult Inpatient Plan of Care  Goal: Plan of Care Review  Outcome: Ongoing (interventions implemented as appropriate)  Patient is calm and cooperative, IV diltiazem DC bridged with PO diltiazem, cardiac consult later today.  Dr. Todd stated venous stasis ulcers bilateral lower legs (instructed patient to wear compression socks to resolve).  Patient is independent and continent.  Wife is at the bedside.

## 2019-05-18 NOTE — SUBJECTIVE & OBJECTIVE
Interval History:  Patient seen and examined.  Transition back to normal sinus rhythm on Cardizem drip.  Remains asymptomatic the moment.  Plan of care discussed the patient and his wife in detail.    Review of Systems   Constitutional: Negative for chills, fatigue and fever.   Respiratory: Negative for cough and shortness of breath.    Cardiovascular: Negative for chest pain and leg swelling.   Gastrointestinal: Negative for abdominal pain, nausea and vomiting.   Musculoskeletal: Negative for back pain.   Skin: Positive for rash.   Neurological: Positive for weakness.   Psychiatric/Behavioral: Negative for confusion. The patient is not nervous/anxious.    All other systems reviewed and are negative.    Objective:     Vital Signs (Most Recent):  Temp: 97.6 °F (36.4 °C) (05/18/19 1604)  Pulse: 76 (05/18/19 1604)  Resp: 20 (05/18/19 1604)  BP: 119/77 (05/18/19 1604)  SpO2: 98 % (05/18/19 1604) Vital Signs (24h Range):  Temp:  [96 °F (35.6 °C)-98.4 °F (36.9 °C)] 97.6 °F (36.4 °C)  Pulse:  [] 76  Resp:  [16-20] 20  SpO2:  [96 %-98 %] 98 %  BP: ()/(56-95) 119/77     Weight: 133.4 kg (294 lb)  Body mass index is 46.05 kg/m².    Intake/Output Summary (Last 24 hours) at 5/18/2019 1824  Last data filed at 5/18/2019 1200  Gross per 24 hour   Intake 440 ml   Output 200 ml   Net 240 ml      Physical Exam   Constitutional: He appears well-developed and well-nourished.   Obese  male in no acute distress   Eyes: Pupils are equal, round, and reactive to light. EOM are normal.   Neck: No JVD present.   Cardiovascular: Normal rate, regular rhythm, normal heart sounds and intact distal pulses.   Pulmonary/Chest: Effort normal and breath sounds normal.   Abdominal: Soft. Bowel sounds are normal. He exhibits no distension. There is no tenderness.   Musculoskeletal: He exhibits no edema or deformity.   Lymphadenopathy:     He has no cervical adenopathy.   Skin: No rash noted. No pallor.   Noted bilateral stasis  dermatitis rash on the lower extremities   Nursing note and vitals reviewed.      Significant Labs: All pertinent labs within the past 24 hours have been reviewed.    Significant Imaging: I have reviewed all pertinent imaging results/findings within the past 24 hours.

## 2019-05-19 LAB
ALBUMIN SERPL BCP-MCNC: 3.3 G/DL (ref 3.5–5.2)
ALP SERPL-CCNC: 52 U/L (ref 55–135)
ALT SERPL W/O P-5'-P-CCNC: 31 U/L (ref 10–44)
ANION GAP SERPL CALC-SCNC: 8 MMOL/L (ref 8–16)
AST SERPL-CCNC: 23 U/L (ref 10–40)
BASOPHILS # BLD AUTO: 0.1 K/UL (ref 0–0.2)
BASOPHILS NFR BLD: 0.7 % (ref 0–1.9)
BILIRUB SERPL-MCNC: 1.1 MG/DL (ref 0.1–1)
BNP SERPL-MCNC: 135 PG/ML (ref 0–99)
BUN SERPL-MCNC: 14 MG/DL (ref 6–20)
CALCIUM SERPL-MCNC: 9.2 MG/DL (ref 8.7–10.5)
CHLORIDE SERPL-SCNC: 104 MMOL/L (ref 95–110)
CO2 SERPL-SCNC: 28 MMOL/L (ref 23–29)
CREAT SERPL-MCNC: 0.9 MG/DL (ref 0.5–1.4)
DIFFERENTIAL METHOD: ABNORMAL
EOSINOPHIL # BLD AUTO: 0.5 K/UL (ref 0–0.5)
EOSINOPHIL NFR BLD: 5 % (ref 0–8)
ERYTHROCYTE [DISTWIDTH] IN BLOOD BY AUTOMATED COUNT: 13.6 % (ref 11.5–14.5)
ERYTHROCYTE [DISTWIDTH] IN BLOOD BY AUTOMATED COUNT: 13.6 % (ref 11.5–14.5)
EST. GFR  (AFRICAN AMERICAN): >60 ML/MIN/1.73 M^2
EST. GFR  (NON AFRICAN AMERICAN): >60 ML/MIN/1.73 M^2
GLUCOSE SERPL-MCNC: 106 MG/DL (ref 70–110)
HCT VFR BLD AUTO: 43.1 % (ref 40–54)
HCT VFR BLD AUTO: 43.1 % (ref 40–54)
HGB BLD-MCNC: 14.1 G/DL (ref 14–18)
HGB BLD-MCNC: 14.1 G/DL (ref 14–18)
LYMPHOCYTES # BLD AUTO: 2.1 K/UL (ref 1–4.8)
LYMPHOCYTES NFR BLD: 21.4 % (ref 18–48)
MAGNESIUM SERPL-MCNC: 2.2 MG/DL (ref 1.6–2.6)
MCH RBC QN AUTO: 31 PG (ref 27–31)
MCH RBC QN AUTO: 31 PG (ref 27–31)
MCHC RBC AUTO-ENTMCNC: 32.7 G/DL (ref 32–36)
MCHC RBC AUTO-ENTMCNC: 32.7 G/DL (ref 32–36)
MCV RBC AUTO: 95 FL (ref 82–98)
MCV RBC AUTO: 95 FL (ref 82–98)
MONOCYTES # BLD AUTO: 1.2 K/UL (ref 0.3–1)
MONOCYTES NFR BLD: 12 % (ref 4–15)
NEUTROPHILS # BLD AUTO: 6 K/UL (ref 1.8–7.7)
NEUTROPHILS NFR BLD: 60.9 % (ref 38–73)
PHOSPHATE SERPL-MCNC: 3.8 MG/DL (ref 2.7–4.5)
PLATELET # BLD AUTO: 271 K/UL (ref 150–350)
PLATELET # BLD AUTO: 271 K/UL (ref 150–350)
PMV BLD AUTO: 8.7 FL (ref 9.2–12.9)
PMV BLD AUTO: 8.7 FL (ref 9.2–12.9)
POTASSIUM SERPL-SCNC: 4.4 MMOL/L (ref 3.5–5.1)
PROT SERPL-MCNC: 7.2 G/DL (ref 6–8.4)
RBC # BLD AUTO: 4.55 M/UL (ref 4.6–6.2)
RBC # BLD AUTO: 4.55 M/UL (ref 4.6–6.2)
SODIUM SERPL-SCNC: 140 MMOL/L (ref 136–145)
WBC # BLD AUTO: 9.8 K/UL (ref 3.9–12.7)
WBC # BLD AUTO: 9.8 K/UL (ref 3.9–12.7)

## 2019-05-19 PROCEDURE — 83880 ASSAY OF NATRIURETIC PEPTIDE: CPT

## 2019-05-19 PROCEDURE — 80053 COMPREHEN METABOLIC PANEL: CPT

## 2019-05-19 PROCEDURE — 85025 COMPLETE CBC W/AUTO DIFF WBC: CPT

## 2019-05-19 PROCEDURE — 11000001 HC ACUTE MED/SURG PRIVATE ROOM

## 2019-05-19 PROCEDURE — 63600175 PHARM REV CODE 636 W HCPCS: Performed by: SPECIALIST

## 2019-05-19 PROCEDURE — 84100 ASSAY OF PHOSPHORUS: CPT

## 2019-05-19 PROCEDURE — 25000003 PHARM REV CODE 250: Performed by: SPECIALIST

## 2019-05-19 PROCEDURE — 94761 N-INVAS EAR/PLS OXIMETRY MLT: CPT

## 2019-05-19 PROCEDURE — 25000003 PHARM REV CODE 250: Performed by: HOSPITALIST

## 2019-05-19 PROCEDURE — 36415 COLL VENOUS BLD VENIPUNCTURE: CPT

## 2019-05-19 PROCEDURE — 83735 ASSAY OF MAGNESIUM: CPT

## 2019-05-19 PROCEDURE — 25000003 PHARM REV CODE 250: Performed by: INTERNAL MEDICINE

## 2019-05-19 RX ORDER — SOTALOL HYDROCHLORIDE 80 MG/1
80 TABLET ORAL 2 TIMES DAILY
Status: DISCONTINUED | OUTPATIENT
Start: 2019-05-19 | End: 2019-05-20

## 2019-05-19 RX ADMIN — DILTIAZEM HYDROCHLORIDE 30 MG: 30 TABLET, FILM COATED ORAL at 12:05

## 2019-05-19 RX ADMIN — ENOXAPARIN SODIUM 130 MG: 150 INJECTION SUBCUTANEOUS at 08:05

## 2019-05-19 RX ADMIN — DILTIAZEM HYDROCHLORIDE 30 MG: 30 TABLET, FILM COATED ORAL at 05:05

## 2019-05-19 RX ADMIN — ASPIRIN 325 MG ORAL TABLET 325 MG: 325 PILL ORAL at 09:05

## 2019-05-19 RX ADMIN — DILTIAZEM HYDROCHLORIDE 30 MG: 30 TABLET, FILM COATED ORAL at 11:05

## 2019-05-19 RX ADMIN — LEVOTHYROXINE SODIUM 25 MCG: 25 TABLET ORAL at 05:05

## 2019-05-19 RX ADMIN — FUROSEMIDE 40 MG: 40 TABLET ORAL at 09:05

## 2019-05-19 RX ADMIN — SOTALOL HYDROCHLORIDE 80 MG: 80 TABLET ORAL at 09:05

## 2019-05-19 RX ADMIN — ENOXAPARIN SODIUM 130 MG: 150 INJECTION SUBCUTANEOUS at 09:05

## 2019-05-19 RX ADMIN — CARVEDILOL 3.12 MG: 3.12 TABLET, FILM COATED ORAL at 09:05

## 2019-05-19 NOTE — CONSULTS
Dictated.  New onset AF RVR - now with controlled rate.  DC cardioversion if AF persists on 5/20/19  Lovenox bid till cardioversion.

## 2019-05-19 NOTE — PROGRESS NOTES
Progress Note  Cardiology    Admit Date: 5/17/2019   LOS: 2 days     Follow-up For:  New onset AF.    Scheduled Meds:   aspirin  325 mg Oral Daily    carvedilol  3.125 mg Oral BID    diltiaZEM  30 mg Oral Q6H    enoxaparin  1 mg/kg Subcutaneous Q12H    levothyroxine  25 mcg Oral Before breakfast     Continuous Infusions:  PRN Meds:sodium chloride 0.9%    Review of patient's allergies indicates:   Allergen Reactions    Ampicillin        SUBJECTIVE:     Interval History: Patient continues in AF with controlled VR.    Review of Systems  Respiratory: negative for cough, hemoptysis, sputum and wheezing  Cardiovascular: negative for chest pressure/discomfort, orthopnea, palpitations and paroxysmal nocturnal dyspnea    OBJECTIVE:     Vital Signs (Most Recent)  Temp: 98 °F (36.7 °C) (05/19/19 0750)  Pulse: 86 (05/19/19 0750)  Resp: 18 (05/19/19 0750)  BP: (!) 128/92 (05/19/19 0750)  SpO2: 98 % (05/19/19 0750)    Vital Signs Range (Last 24H):  Temp:  [96 °F (35.6 °C)-98.3 °F (36.8 °C)]   Pulse:  [76-88]   Resp:  [18-20]   BP: (110-128)/(69-92)   SpO2:  [96 %-98 %]       Physical Exam:  Neck: no carotid bruit, no JVD and supple, symmetrical, trachea midline  Lungs: clear to auscultation bilaterally, normal respiratory effort  Heart: irregularly irregular rhythm  Abdomen: soft, non-tender; bowel sounds normal; no masses,  no organomegaly  Extremities: Extremities normal, atraumatic, no cyanosis, clubbing, or edema    Recent Results (from the past 24 hour(s))   Transthoracic echo (TTE) 2D with Color Flow    Collection Time: 05/18/19  1:30 PM   Result Value Ref Range    BSA 2.51 m2    TDI SEPTAL 0.13     TDI LATERAL 0.16     Mean e' 0.15     AORTIC VALVE CUSP SEPERATION 2.06 cm    PV PEAK VELOCITY 0.61 cm/s    LVIDD 6.06 (A) 3.5 - 6.0 cm    IVS 0.82 0.6 - 1.1 cm    PW 0.82 0.6 - 1.1 cm    Ao root annulus 2.72 cm    LVIDS 4.29 (A) 2.1 - 4.0 cm    FS 29 28 - 44 %    LV mass 195.28 g    LA size 4.70 cm    RVDD 3.45 cm     TAPSE 2.13 cm    Left Ventricle Relative Wall Thickness 0.27 cm    AV mean gradient 3.59 mmHg    AV valve area 3.42 cm2    AV Velocity Ratio 0.81     AV index (prosthetic) 0.87     E wave decelartion time 161.58 msec    IVRT 0.11 msec    LVOT diameter 2.24 cm    LVOT area 3.94 cm2    LVOT peak tavares [id number] m/s    LVOT peak VTI 17.70 cm    Ao peak tavares 1.22 m/s    Ao VTI 20.39 cm    LVOT stroke volume 69.72 cm3    AV peak gradient 5.95 mmHg    TR Max Tavares 2.05 m/s    LV Systolic Volume 82.45 mL    LV Systolic Volume Index 34.6 mL/m2    LV Diastolic Volume 184.04 mL    LV Diastolic Volume Index 77.27 mL/m2    LV Mass Index 82.0 g/m2    Triscuspid Valve Regurgitation Peak Gradient 16.81 mmHg    Right Atrial Pressure (from IVC) 15 mmHg    TV rest pulmonary artery pressure 32 mmHg   CBC auto differential    Collection Time: 05/19/19  4:27 AM   Result Value Ref Range    WBC 9.80 3.90 - 12.70 K/uL    RBC 4.55 (L) 4.60 - 6.20 M/uL    Hemoglobin 14.1 14.0 - 18.0 g/dL    Hematocrit 43.1 40.0 - 54.0 %    Mean Corpuscular Volume 95 82 - 98 fL    Mean Corpuscular Hemoglobin 31.0 27.0 - 31.0 pg    Mean Corpuscular Hemoglobin Conc 32.7 32.0 - 36.0 g/dL    RDW 13.6 11.5 - 14.5 %    Platelets 271 150 - 350 K/uL    MPV 8.7 (L) 9.2 - 12.9 fL    Gran # (ANC) 6.0 1.8 - 7.7 K/uL    Lymph # 2.1 1.0 - 4.8 K/uL    Mono # 1.2 (H) 0.3 - 1.0 K/uL    Eos # 0.5 0.0 - 0.5 K/uL    Baso # 0.10 0.00 - 0.20 K/uL    Gran% 60.9 38.0 - 73.0 %    Lymph% 21.4 18.0 - 48.0 %    Mono% 12.0 4.0 - 15.0 %    Eosinophil% 5.0 0.0 - 8.0 %    Basophil% 0.7 0.0 - 1.9 %    Differential Method Automated    Comprehensive metabolic panel    Collection Time: 05/19/19  4:27 AM   Result Value Ref Range    Sodium 140 136 - 145 mmol/L    Potassium 4.4 3.5 - 5.1 mmol/L    Chloride 104 95 - 110 mmol/L    CO2 28 23 - 29 mmol/L    Glucose 106 70 - 110 mg/dL    BUN, Bld 14 6 - 20 mg/dL    Creatinine 0.9 0.5 - 1.4 mg/dL    Calcium 9.2 8.7 - 10.5 mg/dL    Total Protein 7.2 6.0 -  8.4 g/dL    Albumin 3.3 (L) 3.5 - 5.2 g/dL    Total Bilirubin 1.1 (H) 0.1 - 1.0 mg/dL    Alkaline Phosphatase 52 (L) 55 - 135 U/L    AST 23 10 - 40 U/L    ALT 31 10 - 44 U/L    Anion Gap 8 8 - 16 mmol/L    eGFR if African American >60 >60 mL/min/1.73 m^2    eGFR if non African American >60 >60 mL/min/1.73 m^2   Magnesium    Collection Time: 05/19/19  4:27 AM   Result Value Ref Range    Magnesium 2.2 1.6 - 2.6 mg/dL   Phosphorus    Collection Time: 05/19/19  4:27 AM   Result Value Ref Range    Phosphorus 3.8 2.7 - 4.5 mg/dL   Brain natriuretic peptide    Collection Time: 05/19/19  4:27 AM   Result Value Ref Range     (H) 0 - 99 pg/mL   CBC Without Differential    Collection Time: 05/19/19  4:27 AM   Result Value Ref Range    WBC 9.80 3.90 - 12.70 K/uL    RBC 4.55 (L) 4.60 - 6.20 M/uL    Hemoglobin 14.1 14.0 - 18.0 g/dL    Hematocrit 43.1 40.0 - 54.0 %    Mean Corpuscular Volume 95 82 - 98 fL    Mean Corpuscular Hemoglobin 31.0 27.0 - 31.0 pg    Mean Corpuscular Hemoglobin Conc 32.7 32.0 - 36.0 g/dL    RDW 13.6 11.5 - 14.5 %    Platelets 271 150 - 350 K/uL    MPV 8.7 (L) 9.2 - 12.9 fL       Diagnostic Results:  Labs: Reviewed  ECG: Reviewed    ASSESSMENT/PLAN:     Doing better. AF with controlled VR.    If AF persists, DC cardioversion in am. Sotalol. DC diltiazem and metoprolol.

## 2019-05-19 NOTE — PLAN OF CARE
Pt resting quietly at this time. Able to make needs known. Cont b/b. Denies pain. Bed low and locked. Call light in reach.

## 2019-05-19 NOTE — CONSULTS
HISTORY OF PRESENT ILLNESS:  This 43-year-old white gentleman is admitted with   atrial fibrillation.    The patient awoke at 1:00 a.m. on 2019 with right-sided infrascapular   discomfort that radiated across to the right posterior chest into the right   lateral chest.  The pain was dull in nature, but was aggravated by cough when it   became sharp.  He slept poorly through the night.  He was evaluated by the   nurse at work.  Blood pressure was 140/110 and heart rate in the 50s.  In the   Emergency Room, the patient was in atrial fibrillation with rapid ventricular   response.  He was treated with intravenous diltiazem; he has been transitioned   to diltiazem orally and metoprolol.  His ventricular rate is fairly well   controlled at present.  He did take ZzzQuil  2019 for sleep for the first   time.  There is no prior history of atrial fibrillation, angina pectoris,   myocardial infarction, or congestive heart failure.  He sleeps on one to two   pillows; he does snore some and wakes up with a startle periodically.    PAST MEDICAL HISTORY:  Hypertension x4 years - poor control.  Hypothyroidism,   treated for approximately three months; he has been off meds for two years.    Mild dyslipidemia.  Hematuria during childhood.  The patient has a history of   varicose veins; he has had cellulitis of the left lower extremity some two years   ago.    SOCIAL HISTORY:  The patient does not smoke.  He has never been a smoker.  He   drinks three pints of whiskey a week; he has been drinking for approximately 10   years.  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    FAMILY HISTORY:  Father  at 63 of congestive heart failure; he had CABG   at age 40.  He was a smoker.  Mother is 64 and has diabetes and hypothyroidism.    The patient has a brother, 40 years old, in good health.    REVIEW OF SYSTEMS:  Again, the patient has gained approximately 40 pounds over   the last  seven years.  He reports sinus congestion and postnasal drip.  He has   occasional wheezing and bronchitis.  He does not use an MDI.  He has never had   pneumonia.  He occasionally has heartburn and reflux.  There is no history of   hematochezia, melena, or hematemesis.  He denies hematuria.    MEDICATIONS:  Triamterene/HCTZ p.r.n.    PHYSICAL EXAMINATION:  GENERAL:  This is a well-built, overweight white gentleman, in no acute   distress.  VITAL SIGNS:  Blood pressure 117/77, respirations 20 per minute, pulse 76 per   minute, temperature 97.6 degrees, O2 sats 98%, weight 270 pounds.  EYES:  No conjunctival pallor or scleral icterus.  THROAT:  No masses are observed.  NECK:  Carotids equal without bruits.  No jugular venous distention or   thyromegaly.  Hepatojugular reflux is borderline positive.  CHEST:  Air entry is equal bilaterally.  There were no rales or rhonchi.  HEART:  S1 and S2, irregular.  There were no murmurs, gallops, or rubs.  ABDOMEN:  Obese.  EXTREMITIES:  There is no  clubbing, cyanosis, or edema.  Stasis dermatitis -   probably venous stasis is noted on the medial aspect of the left lower extremity   with ichthyosis.  He does have pruritus.    LABORATORY DATA:  Hemoglobin 13.8, hematocrit 41.5, WBC count is 10,500, and   platelet count 269,000, granulocytes 67% and lymphocytes 19%.  Sodium 139,   potassium 4.2, BUN 10, creatinine 0.9, total protein 7.2, albumin 3.4, bilirubin   1.3.  AST and ALT within normal limits.  Potassium 4.2, cholesterol 184,   triglycerides 120, HDL 27, .  Serial troponins, CK, CK-MB are within   normal limits.  TSH 23.7.  Free T4 0.8.  D-dimer was elevated at 2.93.    IMAGING:  Chest x-ray was reviewed.  There is haziness of both lung bases -   probably secondary to soft tissue.  There is possible minimal cephalization of   flow.  CT of the chest did not reveal any pulmonary emboli.  There were small   bilateral pleural effusions.  A partially calcified nodule is  noted within the   lingula - 1.8 cm; possible granuloma.  There was minimal ground-glass change and   mild septal thickening.    IMPRESSION:  1.  Atrial fibrillation with rapid ventricular response.  2.  Hypertension x4 years; hypothyroidism.  3.  Ethanol excess; obesity; probable sleep apnea.  4.  The echocardiogram reveals mild-to-moderate left atrial dilatation.  LVEF is   normal; the left ventricle is mildly dilated, however.    PLAN:  The patient has been on Lovenox b.i.d., which will be continued.  If he   continues in atrial fibrillation, electrical cardioversion will be planned.  His   CHADS2-VASc score is 1.  He could have early alcoholic cardiomyopathy.  He is   strongly urged to abstain from alcohol and get a sleep study as an outpatient   for evaluation of sleep apnea.  He is strongly urged to lose weight.      BEHZAD  dd: 05/18/2019 20:34:39 (CDT)  td: 05/18/2019 22:05:22 (CDT)  Doc ID   #[id number]  Job ID #[id number]    CC:

## 2019-05-19 NOTE — PLAN OF CARE
05/19/19 1428   Patient Assessment/Suction   Level of Consciousness (AVPU) alert   PRE-TX-O2   O2 Device (Oxygen Therapy) room air   SpO2 98 %

## 2019-05-19 NOTE — PLAN OF CARE
"Problem: Adult Inpatient Plan of Care  Goal: Plan of Care Review  Outcome: Ongoing (interventions implemented as appropriate)  Explained to patient and wife what cardioversion is, educational material given, patient stated "he looked up information on his phone", cardioversion scheduled for 5/20/2019, patient aware of NPO status after midnight.      "

## 2019-05-20 ENCOUNTER — ANESTHESIA EVENT (OUTPATIENT)
Dept: SURGERY | Facility: HOSPITAL | Age: 44
DRG: 309 | End: 2019-05-20
Payer: COMMERCIAL

## 2019-05-20 ENCOUNTER — ANESTHESIA (OUTPATIENT)
Dept: SURGERY | Facility: HOSPITAL | Age: 44
DRG: 309 | End: 2019-05-20
Payer: COMMERCIAL

## 2019-05-20 LAB
ALBUMIN SERPL BCP-MCNC: 3.4 G/DL (ref 3.5–5.2)
ALP SERPL-CCNC: 54 U/L (ref 55–135)
ALT SERPL W/O P-5'-P-CCNC: 27 U/L (ref 10–44)
ANION GAP SERPL CALC-SCNC: 9 MMOL/L (ref 8–16)
AST SERPL-CCNC: 25 U/L (ref 10–40)
BASOPHILS # BLD AUTO: 0.1 K/UL (ref 0–0.2)
BASOPHILS NFR BLD: 0.9 % (ref 0–1.9)
BILIRUB SERPL-MCNC: 0.9 MG/DL (ref 0.1–1)
BUN SERPL-MCNC: 13 MG/DL (ref 6–20)
CALCIUM SERPL-MCNC: 9.2 MG/DL (ref 8.7–10.5)
CHLORIDE SERPL-SCNC: 103 MMOL/L (ref 95–110)
CO2 SERPL-SCNC: 27 MMOL/L (ref 23–29)
CREAT SERPL-MCNC: 0.9 MG/DL (ref 0.5–1.4)
DIFFERENTIAL METHOD: ABNORMAL
EOSINOPHIL # BLD AUTO: 0.6 K/UL (ref 0–0.5)
EOSINOPHIL NFR BLD: 5.4 % (ref 0–8)
ERYTHROCYTE [DISTWIDTH] IN BLOOD BY AUTOMATED COUNT: 13.4 % (ref 11.5–14.5)
ERYTHROCYTE [DISTWIDTH] IN BLOOD BY AUTOMATED COUNT: 13.4 % (ref 11.5–14.5)
EST. GFR  (AFRICAN AMERICAN): >60 ML/MIN/1.73 M^2
EST. GFR  (NON AFRICAN AMERICAN): >60 ML/MIN/1.73 M^2
GLUCOSE SERPL-MCNC: 105 MG/DL (ref 70–110)
HCT VFR BLD AUTO: 46.4 % (ref 40–54)
HCT VFR BLD AUTO: 46.4 % (ref 40–54)
HGB BLD-MCNC: 15.2 G/DL (ref 14–18)
HGB BLD-MCNC: 15.2 G/DL (ref 14–18)
LYMPHOCYTES # BLD AUTO: 2.1 K/UL (ref 1–4.8)
LYMPHOCYTES NFR BLD: 19.3 % (ref 18–48)
MAGNESIUM SERPL-MCNC: 2.3 MG/DL (ref 1.6–2.6)
MCH RBC QN AUTO: 31.1 PG (ref 27–31)
MCH RBC QN AUTO: 31.1 PG (ref 27–31)
MCHC RBC AUTO-ENTMCNC: 32.8 G/DL (ref 32–36)
MCHC RBC AUTO-ENTMCNC: 32.8 G/DL (ref 32–36)
MCV RBC AUTO: 95 FL (ref 82–98)
MCV RBC AUTO: 95 FL (ref 82–98)
MONOCYTES # BLD AUTO: 1.3 K/UL (ref 0.3–1)
MONOCYTES NFR BLD: 12 % (ref 4–15)
NEUTROPHILS # BLD AUTO: 6.9 K/UL (ref 1.8–7.7)
NEUTROPHILS NFR BLD: 62.4 % (ref 38–73)
PHOSPHATE SERPL-MCNC: 3.8 MG/DL (ref 2.7–4.5)
PLATELET # BLD AUTO: 300 K/UL (ref 150–350)
PLATELET # BLD AUTO: 300 K/UL (ref 150–350)
PMV BLD AUTO: 8.9 FL (ref 9.2–12.9)
PMV BLD AUTO: 8.9 FL (ref 9.2–12.9)
POTASSIUM SERPL-SCNC: 4.1 MMOL/L (ref 3.5–5.1)
PROT SERPL-MCNC: 7.7 G/DL (ref 6–8.4)
RBC # BLD AUTO: 4.91 M/UL (ref 4.6–6.2)
RBC # BLD AUTO: 4.91 M/UL (ref 4.6–6.2)
SODIUM SERPL-SCNC: 139 MMOL/L (ref 136–145)
WBC # BLD AUTO: 11.1 K/UL (ref 3.9–12.7)
WBC # BLD AUTO: 11.1 K/UL (ref 3.9–12.7)

## 2019-05-20 PROCEDURE — 36415 COLL VENOUS BLD VENIPUNCTURE: CPT

## 2019-05-20 PROCEDURE — 83735 ASSAY OF MAGNESIUM: CPT

## 2019-05-20 PROCEDURE — 85025 COMPLETE CBC W/AUTO DIFF WBC: CPT

## 2019-05-20 PROCEDURE — 63600175 PHARM REV CODE 636 W HCPCS: Performed by: SPECIALIST

## 2019-05-20 PROCEDURE — D9220A PRA ANESTHESIA: ICD-10-PCS | Mod: ANES,,, | Performed by: ANESTHESIOLOGY

## 2019-05-20 PROCEDURE — 92960 CARDIOVERSION ELECTRIC EXT: CPT | Performed by: SPECIALIST

## 2019-05-20 PROCEDURE — 80053 COMPREHEN METABOLIC PANEL: CPT

## 2019-05-20 PROCEDURE — 37000008 HC ANESTHESIA 1ST 15 MINUTES

## 2019-05-20 PROCEDURE — D9220A PRA ANESTHESIA: Mod: ANES,,, | Performed by: ANESTHESIOLOGY

## 2019-05-20 PROCEDURE — 63600175 PHARM REV CODE 636 W HCPCS: Performed by: NURSE ANESTHETIST, CERTIFIED REGISTERED

## 2019-05-20 PROCEDURE — D9220A PRA ANESTHESIA: Mod: CRNA,,, | Performed by: NURSE ANESTHETIST, CERTIFIED REGISTERED

## 2019-05-20 PROCEDURE — 93005 ELECTROCARDIOGRAM TRACING: CPT

## 2019-05-20 PROCEDURE — 25000003 PHARM REV CODE 250: Performed by: SPECIALIST

## 2019-05-20 PROCEDURE — 84100 ASSAY OF PHOSPHORUS: CPT

## 2019-05-20 PROCEDURE — D9220A PRA ANESTHESIA: ICD-10-PCS | Mod: CRNA,,, | Performed by: NURSE ANESTHETIST, CERTIFIED REGISTERED

## 2019-05-20 PROCEDURE — 25000003 PHARM REV CODE 250: Performed by: INTERNAL MEDICINE

## 2019-05-20 PROCEDURE — 94760 N-INVAS EAR/PLS OXIMETRY 1: CPT

## 2019-05-20 PROCEDURE — 11000001 HC ACUTE MED/SURG PRIVATE ROOM

## 2019-05-20 PROCEDURE — 94761 N-INVAS EAR/PLS OXIMETRY MLT: CPT

## 2019-05-20 RX ORDER — MIDAZOLAM HYDROCHLORIDE 1 MG/ML
INJECTION, SOLUTION INTRAMUSCULAR; INTRAVENOUS
Status: DISCONTINUED | OUTPATIENT
Start: 2019-05-20 | End: 2019-05-20 | Stop reason: HOSPADM

## 2019-05-20 RX ORDER — ASPIRIN 325 MG
325 TABLET ORAL DAILY
Refills: 0 | COMMUNITY
Start: 2019-05-20 | End: 2019-05-21 | Stop reason: HOSPADM

## 2019-05-20 RX ORDER — PROPOFOL 10 MG/ML
VIAL (ML) INTRAVENOUS
Status: DISCONTINUED | OUTPATIENT
Start: 2019-05-20 | End: 2019-05-20

## 2019-05-20 RX ORDER — FENTANYL CITRATE 50 UG/ML
INJECTION, SOLUTION INTRAMUSCULAR; INTRAVENOUS
Status: DISCONTINUED | OUTPATIENT
Start: 2019-05-20 | End: 2019-05-20 | Stop reason: HOSPADM

## 2019-05-20 RX ORDER — LEVOTHYROXINE SODIUM 25 UG/1
25 TABLET ORAL
Qty: 30 TABLET | Refills: 11 | Status: SHIPPED | OUTPATIENT
Start: 2019-05-21 | End: 2019-05-21

## 2019-05-20 RX ORDER — SOTALOL HYDROCHLORIDE 80 MG/1
80 TABLET ORAL 2 TIMES DAILY
Qty: 60 TABLET | Refills: 11 | Status: SHIPPED | OUTPATIENT
Start: 2019-05-20 | End: 2019-05-21

## 2019-05-20 RX ADMIN — SOTALOL HYDROCHLORIDE 120 MG: 80 TABLET ORAL at 08:05

## 2019-05-20 RX ADMIN — PROPOFOL 50 MG: 10 INJECTION, EMULSION INTRAVENOUS at 12:05

## 2019-05-20 RX ADMIN — APIXABAN 5 MG: 2.5 TABLET, FILM COATED ORAL at 08:05

## 2019-05-20 RX ADMIN — ENOXAPARIN SODIUM 130 MG: 150 INJECTION SUBCUTANEOUS at 01:05

## 2019-05-20 RX ADMIN — SOTALOL HYDROCHLORIDE 80 MG: 80 TABLET ORAL at 01:05

## 2019-05-20 RX ADMIN — LEVOTHYROXINE SODIUM 25 MCG: 25 TABLET ORAL at 05:05

## 2019-05-20 NOTE — ANESTHESIA PREPROCEDURE EVALUATION
05/20/2019  Bernard Tinajero is a 43 y.o., male.    Anesthesia Evaluation    I have reviewed the Patient Summary Reports.    I have reviewed the Nursing Notes.   I have reviewed the Medications.     Review of Systems  Anesthesia Hx:  Denies Family Hx of Anesthesia complications.   Denies Personal Hx of Anesthesia complications.   Cardiovascular:   Hypertension Dysrhythmias atrial fibrillation ECG has been reviewed.    Endocrine:   Hypothyroidism        Physical Exam  General:  Malnutrition, Morbid Obesity    Airway/Jaw/Neck:  Airway Findings: Mouth Opening: Normal Tongue: Normal  General Airway Assessment: Pediatric  Mallampati: IV  Improves to III with phonation.  TM Distance: Normal, at least 6 cm  Jaw/Neck Findings:  Neck ROM: Normal ROM  Neck Findings:  Girth Increased      Dental:  Dental Findings: In tact   Chest/Lungs:  Chest/Lungs Clear    Heart/Vascular:  Heart Findings: Rate: Tachycardia  Rhythm: Irregularly Irregular             Anesthesia Plan  Type of Anesthesia, risks & benefits discussed:  Anesthesia Type:  general  Patient's Preference:   Intra-op Monitoring Plan: standard ASA monitors  Intra-op Monitoring Plan Comments:   Post Op Pain Control Plan:   Post Op Pain Control Plan Comments:   Induction:   IV  Beta Blocker:  Patient is not currently on a Beta-Blocker (No further documentation required).       Informed Consent: Patient representative understands risks and agrees with Anesthesia plan.  Questions answered. Anesthesia consent signed with patient representative.  ASA Score: 3     Day of Surgery Review of History & Physical:    H&P update referred to the provider.         Ready For Surgery From Anesthesia Perspective.

## 2019-05-20 NOTE — BRIEF OP NOTE
DC cardioversion with 200 J shock to NSR.  Contnue sotalol . Increase to 120 mg bid this pm if no pro arrhythmia or QTc prolongation.  Transition to Eliquis for 4 weeks - DC  and ASA thereafter.  Home 5/22 pm if no pro arrhythmmia.

## 2019-05-20 NOTE — PLAN OF CARE
Met with patient and spouse at bedside to inquire whether patient has a PCP.  Spouse states that she goes to Dr Enriquez in [place], and is going to schedule patient with him as well.       05/20/19 1035   Discharge Reassessment   Assessment Type Discharge Planning Reassessment

## 2019-05-20 NOTE — SUBJECTIVE & OBJECTIVE
Interval History:  Patient seen and examined.  Patient continues to go in and out of atrial fibrillation.  Blood pressure and heart rate remained controlled.  Case discussed in detail with Cardiology.    Review of Systems   Constitutional: Negative for chills, fatigue and fever.   Respiratory: Negative for cough and shortness of breath.    Cardiovascular: Negative for chest pain and leg swelling.   Gastrointestinal: Negative for abdominal pain, nausea and vomiting.   Musculoskeletal: Negative for back pain.   Skin: Positive for rash.   Neurological: Positive for weakness.   Psychiatric/Behavioral: Negative for confusion. The patient is not nervous/anxious.    All other systems reviewed and are negative.    Objective:     Vital Signs (Most Recent):  Temp: 98.5 °F (36.9 °C) (05/19/19 2039)  Pulse: (!) 113 (05/19/19 2039)  Resp: 18 (05/19/19 1547)  BP: (!) 160/99 (05/19/19 2039)  SpO2: 99 % (05/19/19 2039) Vital Signs (24h Range):  Temp:  [96.9 °F (36.1 °C)-98.5 °F (36.9 °C)] 98.5 °F (36.9 °C)  Pulse:  [] 113  Resp:  [18-20] 18  SpO2:  [97 %-99 %] 99 %  BP: (121-160)/(77-99) 160/99     Weight: 133.4 kg (294 lb)  Body mass index is 46.05 kg/m².    Intake/Output Summary (Last 24 hours) at 5/19/2019 2108  Last data filed at 5/19/2019 1821  Gross per 24 hour   Intake 120 ml   Output 851 ml   Net -731 ml      Physical Exam   Constitutional: He appears well-developed and well-nourished.   Obese  male in no acute distress   Eyes: Pupils are equal, round, and reactive to light. EOM are normal.   Neck: No JVD present.   Cardiovascular: Normal heart sounds and intact distal pulses.   Irregularly irregular rhythm, controlled rate.   Pulmonary/Chest: Effort normal and breath sounds normal.   Abdominal: Soft. Bowel sounds are normal. He exhibits no distension. There is no tenderness.   Musculoskeletal: He exhibits no edema or deformity.   Lymphadenopathy:     He has no cervical adenopathy.   Skin: No rash noted. No  pallor.   Noted bilateral stasis dermatitis rash on the lower extremities   Nursing note and vitals reviewed.      Significant Labs: All pertinent labs within the past 24 hours have been reviewed.    Significant Imaging: I have reviewed all pertinent imaging results/findings within the past 24 hours.

## 2019-05-20 NOTE — ANESTHESIA POSTPROCEDURE EVALUATION
Anesthesia Post Evaluation    Patient: Bernard Tinajero    Procedure(s) Performed: Procedure(s) (LRB):  Cardioversion (N/A)    Final Anesthesia Type: general  Patient location during evaluation: PACU  Patient participation: Yes- Able to Participate  Level of consciousness: awake and alert  Post-procedure vital signs: reviewed and stable  Pain management: adequate  Airway patency: patent  PONV status at discharge: No PONV  Anesthetic complications: no      Cardiovascular status: blood pressure returned to baseline  Respiratory status: unassisted  Hydration status: euvolemic  Follow-up not needed.          Vitals Value Taken Time   /91 5/20/2019 12:47 PM   Temp 36.9 °C (98.5 °F) 5/20/2019  7:12 AM   Pulse 76 5/20/2019 12:49 PM   Resp 17 5/20/2019 12:49 PM   SpO2 95 % 5/20/2019 12:49 PM   Vitals shown include unvalidated device data.      No case tracking events are documented in the log.      Pain/Hawa Score: Hawa Score: 10 (5/20/2019 12:45 PM)

## 2019-05-20 NOTE — PLAN OF CARE
5/20/2019    Patient: Bernard Tinajero    YOB: 1975    To whom it may concern,    Bernard Tinajero was admitted to the hospital on 5/17/2019 and was discharged on 5/22/2019. Patient was under my care at the hospital and is cleared to return to work on 5/27/19, with no restrictions. If you have any questions or concerns, please don't hesitate to contact the department of hospital medicine at [facility] at [phone].     Sincerely,    Rich Todd MD    Department of Hospital Medicine

## 2019-05-20 NOTE — PLAN OF CARE
Pt resting quietly at this time. Able to make needs known. Cont b/b. Denies pain during this shift. Able to ambulate without difficulty. Bed low and locked. Call light in reach.

## 2019-05-20 NOTE — ASSESSMENT & PLAN NOTE
Atrial Fibrillation controlled currently with Beta Blocker and Calcium Channel Blocker. [id number] score 1. Anticoagulation indicated currently.   The patient remains in atrial fibrillation by tomorrow morning, Cardiology will undergo ADRIAN cardioversion.  Will continue patient on full-dose anticoagulation until cardioversion done.  Afterwards, likely the patient could switch to aspirin given his low chads  score.

## 2019-05-20 NOTE — PLAN OF CARE
Patient anticipated to d/c home on Eliquis.  According to the insurance formulary, a PA is not required.       05/20/19 1608   Discharge Reassessment   Assessment Type Discharge Planning Reassessment

## 2019-05-20 NOTE — PLAN OF CARE
I received a call from Tran Rollins liason with [employer] stating that she is available for discharge planning. Ashlee Cornejo, [facility]    05/20/19 7747   Discharge Assessment   Assessment Type Discharge Planning Reassessment

## 2019-05-20 NOTE — PROGRESS NOTES
[facility] Hospital Medicine  Progress Note    Patient Name: Bernard Tinajero  MRN: [id number]  Patient Class: IP- Inpatient   Admission Date: 5/17/2019  Length of Stay: 2 days  Attending Physician: Rich Todd MD  Primary Care Provider: Primary Doctor No        Subjective:     Principal Problem:Atrial fibrillation with RVR    HPI:  No notes on file    Hospital Course:  No notes on file    Interval History:  Patient seen and examined.  Patient continues to go in and out of atrial fibrillation.  Blood pressure and heart rate remained controlled.  Case discussed in detail with Cardiology.    Review of Systems   Constitutional: Negative for chills, fatigue and fever.   Respiratory: Negative for cough and shortness of breath.    Cardiovascular: Negative for chest pain and leg swelling.   Gastrointestinal: Negative for abdominal pain, nausea and vomiting.   Musculoskeletal: Negative for back pain.   Skin: Positive for rash.   Neurological: Positive for weakness.   Psychiatric/Behavioral: Negative for confusion. The patient is not nervous/anxious.    All other systems reviewed and are negative.    Objective:     Vital Signs (Most Recent):  Temp: 98.5 °F (36.9 °C) (05/19/19 2039)  Pulse: (!) 113 (05/19/19 2039)  Resp: 18 (05/19/19 1547)  BP: (!) 160/99 (05/19/19 2039)  SpO2: 99 % (05/19/19 2039) Vital Signs (24h Range):  Temp:  [96.9 °F (36.1 °C)-98.5 °F (36.9 °C)] 98.5 °F (36.9 °C)  Pulse:  [] 113  Resp:  [18-20] 18  SpO2:  [97 %-99 %] 99 %  BP: (121-160)/(77-99) 160/99     Weight: 133.4 kg (294 lb)  Body mass index is 46.05 kg/m².    Intake/Output Summary (Last 24 hours) at 5/19/2019 2108  Last data filed at 5/19/2019 1821  Gross per 24 hour   Intake 120 ml   Output 851 ml   Net -731 ml      Physical Exam   Constitutional: He appears well-developed and well-nourished.   Obese  male in no acute distress   Eyes: Pupils are equal, round, and reactive to light. EOM are normal.   Neck: No JVD  present.   Cardiovascular: Normal heart sounds and intact distal pulses.   Irregularly irregular rhythm, controlled rate.   Pulmonary/Chest: Effort normal and breath sounds normal.   Abdominal: Soft. Bowel sounds are normal. He exhibits no distension. There is no tenderness.   Musculoskeletal: He exhibits no edema or deformity.   Lymphadenopathy:     He has no cervical adenopathy.   Skin: No rash noted. No pallor.   Noted bilateral stasis dermatitis rash on the lower extremities   Nursing note and vitals reviewed.      Significant Labs: All pertinent labs within the past 24 hours have been reviewed.    Significant Imaging: I have reviewed all pertinent imaging results/findings within the past 24 hours.    Assessment/Plan:      * Atrial fibrillation with RVR  Atrial Fibrillation controlled currently with Beta Blocker and Calcium Channel Blocker. [id number] score 1. Anticoagulation indicated currently.   The patient remains in atrial fibrillation by tomorrow morning, Cardiology will undergo ADRIAN cardioversion.  Will continue patient on full-dose anticoagulation until cardioversion done.  Afterwards, likely the patient could switch to aspirin given his low chads  score.        Essential hypertension  Chronic, controlled.  Latest blood pressure and vitals reviewed-   Temp:  [96 °F (35.6 °C)-98.4 °F (36.9 °C)]   Pulse:  []   Resp:  [16-20]   BP: ()/(56-95)   SpO2:  [96 %-98 %] .   Current meds for hypertension include   Carvedilol, diltiazem.  Will continue BP medications as needed for sustained BP control.          Patient's noncompliance with other medical treatment and regimen  Medical compliance counseling performed.    Hypothyroidism  Patient admits prior history of hypothyroidism but never took the medication.  Start Synthroid at lower dose 25 mcg daily.  Avoiding higher does due to atrial fibrillation with rapid ventricular response.    Morbid obesity  Body mass index is 46.05 kg/m². Morbid obesity  complicates all aspects of disease management from diagnostic modalities to treatment. Weight loss encouraged and health benefits explained to patient.            VTE Risk Mitigation (From admission, onward)        Ordered     enoxaparin injection 130 mg  Every 12 hours      05/18/19 2040              Rich Todd MD  Department of Hospital Medicine   [facility]

## 2019-05-20 NOTE — TRANSFER OF CARE
"Anesthesia Transfer of Care Note    Patient: Bernard Tinajero    Procedure(s) Performed: Procedure(s) (LRB):  Cardioversion (N/A)    Patient location: PACU    Anesthesia Type: general    Transport from OR: Transported from OR on 2-3 L/min O2 by NC with adequate spontaneous ventilation    Post pain: adequate analgesia    Post assessment: no apparent anesthetic complications and tolerated procedure well    Post vital signs: stable    Level of consciousness: awake, alert and oriented    Nausea/Vomiting: no nausea/vomiting    Complications: none    Transfer of care protocol was followed      Last vitals:   Visit Vitals  BP (!) 137/103   Pulse (!) 120   Temp 36.9 °C (98.5 °F) (Oral)   Resp 11   Ht 5' 7" (1.702 m)   Wt 133.4 kg (294 lb)   SpO2 95%   BMI 46.05 kg/m²     "

## 2019-05-21 LAB
ALBUMIN SERPL BCP-MCNC: 3.3 G/DL (ref 3.5–5.2)
ALP SERPL-CCNC: 51 U/L (ref 55–135)
ALT SERPL W/O P-5'-P-CCNC: 28 U/L (ref 10–44)
ANION GAP SERPL CALC-SCNC: 9 MMOL/L (ref 8–16)
AST SERPL-CCNC: 28 U/L (ref 10–40)
BASOPHILS # BLD AUTO: 0 K/UL (ref 0–0.2)
BASOPHILS NFR BLD: 0.4 % (ref 0–1.9)
BILIRUB SERPL-MCNC: 0.9 MG/DL (ref 0.1–1)
BUN SERPL-MCNC: 18 MG/DL (ref 6–20)
CALCIUM SERPL-MCNC: 9.2 MG/DL (ref 8.7–10.5)
CHLORIDE SERPL-SCNC: 104 MMOL/L (ref 95–110)
CO2 SERPL-SCNC: 26 MMOL/L (ref 23–29)
CREAT SERPL-MCNC: 0.9 MG/DL (ref 0.5–1.4)
DIFFERENTIAL METHOD: ABNORMAL
EOSINOPHIL # BLD AUTO: 0.6 K/UL (ref 0–0.5)
EOSINOPHIL NFR BLD: 6 % (ref 0–8)
ERYTHROCYTE [DISTWIDTH] IN BLOOD BY AUTOMATED COUNT: 13.2 % (ref 11.5–14.5)
ERYTHROCYTE [DISTWIDTH] IN BLOOD BY AUTOMATED COUNT: 13.2 % (ref 11.5–14.5)
EST. GFR  (AFRICAN AMERICAN): >60 ML/MIN/1.73 M^2
EST. GFR  (NON AFRICAN AMERICAN): >60 ML/MIN/1.73 M^2
GLUCOSE SERPL-MCNC: 98 MG/DL (ref 70–110)
HCT VFR BLD AUTO: 47.3 % (ref 40–54)
HCT VFR BLD AUTO: 47.3 % (ref 40–54)
HGB BLD-MCNC: 15.9 G/DL (ref 14–18)
HGB BLD-MCNC: 15.9 G/DL (ref 14–18)
LYMPHOCYTES # BLD AUTO: 1.8 K/UL (ref 1–4.8)
LYMPHOCYTES NFR BLD: 17.9 % (ref 18–48)
MAGNESIUM SERPL-MCNC: 2.1 MG/DL (ref 1.6–2.6)
MCH RBC QN AUTO: 31.5 PG (ref 27–31)
MCH RBC QN AUTO: 31.5 PG (ref 27–31)
MCHC RBC AUTO-ENTMCNC: 33.5 G/DL (ref 32–36)
MCHC RBC AUTO-ENTMCNC: 33.5 G/DL (ref 32–36)
MCV RBC AUTO: 94 FL (ref 82–98)
MCV RBC AUTO: 94 FL (ref 82–98)
MONOCYTES # BLD AUTO: 1.1 K/UL (ref 0.3–1)
MONOCYTES NFR BLD: 10.9 % (ref 4–15)
NEUTROPHILS # BLD AUTO: 6.6 K/UL (ref 1.8–7.7)
NEUTROPHILS NFR BLD: 64.8 % (ref 38–73)
PHOSPHATE SERPL-MCNC: 4.3 MG/DL (ref 2.7–4.5)
PLATELET # BLD AUTO: 316 K/UL (ref 150–350)
PLATELET # BLD AUTO: 316 K/UL (ref 150–350)
PMV BLD AUTO: 8.8 FL (ref 9.2–12.9)
PMV BLD AUTO: 8.8 FL (ref 9.2–12.9)
POTASSIUM SERPL-SCNC: 4.3 MMOL/L (ref 3.5–5.1)
PROT SERPL-MCNC: 7.5 G/DL (ref 6–8.4)
RBC # BLD AUTO: 5.04 M/UL (ref 4.6–6.2)
RBC # BLD AUTO: 5.04 M/UL (ref 4.6–6.2)
SODIUM SERPL-SCNC: 139 MMOL/L (ref 136–145)
WBC # BLD AUTO: 10.2 K/UL (ref 3.9–12.7)
WBC # BLD AUTO: 10.2 K/UL (ref 3.9–12.7)

## 2019-05-21 PROCEDURE — 25000003 PHARM REV CODE 250: Performed by: INTERNAL MEDICINE

## 2019-05-21 PROCEDURE — 25000003 PHARM REV CODE 250: Performed by: SPECIALIST

## 2019-05-21 PROCEDURE — 83735 ASSAY OF MAGNESIUM: CPT

## 2019-05-21 PROCEDURE — 80053 COMPREHEN METABOLIC PANEL: CPT

## 2019-05-21 PROCEDURE — 84100 ASSAY OF PHOSPHORUS: CPT

## 2019-05-21 PROCEDURE — 36415 COLL VENOUS BLD VENIPUNCTURE: CPT

## 2019-05-21 PROCEDURE — 85025 COMPLETE CBC W/AUTO DIFF WBC: CPT

## 2019-05-21 PROCEDURE — 94761 N-INVAS EAR/PLS OXIMETRY MLT: CPT

## 2019-05-21 PROCEDURE — 11000001 HC ACUTE MED/SURG PRIVATE ROOM

## 2019-05-21 PROCEDURE — 25000003 PHARM REV CODE 250: Performed by: HOSPITALIST

## 2019-05-21 RX ORDER — SOTALOL HYDROCHLORIDE 120 MG/1
120 TABLET ORAL 2 TIMES DAILY
Qty: 60 TABLET | Refills: 11 | Status: SHIPPED | OUTPATIENT
Start: 2019-05-21 | End: 2019-05-21

## 2019-05-21 RX ORDER — SOTALOL HYDROCHLORIDE 120 MG/1
120 TABLET ORAL 2 TIMES DAILY
Qty: 60 TABLET | Refills: 11 | Status: SHIPPED | OUTPATIENT
Start: 2019-05-21 | End: 2020-05-20

## 2019-05-21 RX ORDER — LEVOTHYROXINE SODIUM 25 UG/1
25 TABLET ORAL
Qty: 30 TABLET | Refills: 11 | Status: SHIPPED | OUTPATIENT
Start: 2019-05-21 | End: 2020-05-20

## 2019-05-21 RX ADMIN — SOTALOL HYDROCHLORIDE 120 MG: 80 TABLET ORAL at 09:05

## 2019-05-21 RX ADMIN — APIXABAN 5 MG: 2.5 TABLET, FILM COATED ORAL at 09:05

## 2019-05-21 RX ADMIN — SOTALOL HYDROCHLORIDE 120 MG: 80 TABLET ORAL at 08:05

## 2019-05-21 RX ADMIN — LEVOTHYROXINE SODIUM 25 MCG: 25 TABLET ORAL at 06:05

## 2019-05-21 RX ADMIN — ASPIRIN 325 MG ORAL TABLET 325 MG: 325 PILL ORAL at 09:05

## 2019-05-21 RX ADMIN — APIXABAN 5 MG: 2.5 TABLET, FILM COATED ORAL at 08:05

## 2019-05-21 NOTE — PLAN OF CARE
Problem: Adult Inpatient Plan of Care  Goal: Plan of Care Review  Outcome: Ongoing (interventions implemented as appropriate)  Bed is in low position, call light is within reach, SR up x 2, instructed to use call light for assistance.  Cardiac monitored SR, VSS, no complaints of pain, no signs of distress, safety maintained, resting between care, will continue to monitor and round.

## 2019-05-21 NOTE — ASSESSMENT & PLAN NOTE
Atrial Fibrillation controlled currently with Beta Blocker and Calcium Channel Blocker. [id number] score 1. Anticoagulation indicated currently. Will continue patient on full-dose anticoagulation at the request of Cardiology.

## 2019-05-21 NOTE — PLAN OF CARE
Provided patient with a 30 days free offer card for Eliquis.       05/21/19 4088   Discharge Reassessment   Assessment Type Discharge Planning Reassessment

## 2019-05-21 NOTE — PLAN OF CARE
Problem: Adult Inpatient Plan of Care  Goal: Plan of Care Review  Outcome: Ongoing (interventions implemented as appropriate)  Pt has been sleeping on and off this afternoon since retuning from cardioversion. Pt states he feels better. EKG sent to Dr Sykes. Orders received.

## 2019-05-21 NOTE — PROGRESS NOTES
[facility] Hospital Medicine  Progress Note    Patient Name: Bernard Tinajero  MRN: [id number]  Patient Class: IP- Inpatient   Admission Date: 5/17/2019  Length of Stay: 3 days  Attending Physician: Rich Todd MD  Primary Care Provider: Stalin Enriquez Iv, MD        Subjective:     Principal Problem:Atrial fibrillation with RVR    HPI:  No notes on file    Hospital Course:  No notes on file    Interval History:  Patient seen and examined.  Patient continues to go in and out of atrial fibrillation.  Blood pressure and heart rate remained controlled.  Patient underwent ADRIAN cardioversion today without difficulty.  Monitored overnight at the request of Cardiology.    Review of Systems   Constitutional: Negative for chills, fatigue and fever.   Respiratory: Negative for cough and shortness of breath.    Cardiovascular: Negative for chest pain and leg swelling.   Gastrointestinal: Negative for abdominal pain, nausea and vomiting.   Musculoskeletal: Negative for back pain.   Skin: Positive for rash.   Neurological: Positive for weakness.   Psychiatric/Behavioral: Negative for confusion. The patient is not nervous/anxious.    All other systems reviewed and are negative.    Objective:     Vital Signs (Most Recent):  Temp: 97.4 °F (36.3 °C) (05/20/19 1941)  Pulse: 75 (05/20/19 1941)  Resp: 16 (05/20/19 1941)  BP: 114/73 (05/20/19 1941)  SpO2: 97 % (05/20/19 2010) Vital Signs (24h Range):  Temp:  [96.8 °F (36 °C)-98.5 °F (36.9 °C)] 97.4 °F (36.3 °C)  Pulse:  [] 75  Resp:  [10-27] 16  SpO2:  [91 %-100 %] 97 %  BP: (104-165)/() 114/73     Weight: 133.4 kg (294 lb)  Body mass index is 46.05 kg/m².    Intake/Output Summary (Last 24 hours) at 5/20/2019 2143  Last data filed at 5/20/2019 1900  Gross per 24 hour   Intake 360 ml   Output 800 ml   Net -440 ml      Physical Exam   Constitutional: He appears well-developed and well-nourished.   Obese  male in no acute distress   Eyes: Pupils are  equal, round, and reactive to light. EOM are normal.   Neck: No JVD present.   Cardiovascular: Normal heart sounds and intact distal pulses.   Irregularly irregular rhythm, controlled rate.   Pulmonary/Chest: Effort normal and breath sounds normal.   Abdominal: Soft. Bowel sounds are normal. He exhibits no distension. There is no tenderness.   Musculoskeletal: He exhibits no edema or deformity.   Lymphadenopathy:     He has no cervical adenopathy.   Skin: No rash noted. No pallor.   Noted bilateral stasis dermatitis rash on the lower extremities   Nursing note and vitals reviewed.      Significant Labs: All pertinent labs within the past 24 hours have been reviewed.    Significant Imaging: I have reviewed all pertinent imaging results/findings within the past 24 hours.    Assessment/Plan:      * Atrial fibrillation with RVR  Atrial Fibrillation controlled currently with Beta Blocker and Calcium Channel Blocker. [id number] score 1. Anticoagulation indicated currently. Will continue patient on full-dose anticoagulation at the request of Cardiology.        Essential hypertension  Chronic, controlled.  Latest blood pressure and vitals reviewed-   Temp:  [96.8 °F (36 °C)-98.5 °F (36.9 °C)]   Pulse:  []   Resp:  [10-27]   BP: (104-165)/()   SpO2:  [91 %-100 %] .   Current meds for hypertension include  Sotalol.  Will continue BP medications as needed for sustained BP control.          Patient's noncompliance with other medical treatment and regimen  Medical compliance counseling performed.    Hypothyroidism  Patient admits prior history of hypothyroidism but never took the medication.  Start Synthroid at lower dose 25 mcg daily.  Avoiding higher does due to atrial fibrillation with rapid ventricular response.    Morbid obesity  Body mass index is 46.05 kg/m². Morbid obesity complicates all aspects of disease management from diagnostic modalities to treatment. Weight loss encouraged and health benefits  explained to patient.            VTE Risk Mitigation (From admission, onward)        Ordered     apixaban tablet 5 mg  2 times daily      05/20/19 7053              Rich Todd MD  Department of Hospital Medicine   [facility]

## 2019-05-21 NOTE — ASSESSMENT & PLAN NOTE
Chronic, controlled.  Latest blood pressure and vitals reviewed-   Temp:  [96.8 °F (36 °C)-98.5 °F (36.9 °C)]   Pulse:  []   Resp:  [10-27]   BP: (104-165)/()   SpO2:  [91 %-100 %] .   Current meds for hypertension include  Sotalol.  Will continue BP medications as needed for sustained BP control.

## 2019-05-21 NOTE — SUBJECTIVE & OBJECTIVE
Interval History:  Patient seen and examined.  Patient continues to go in and out of atrial fibrillation.  Blood pressure and heart rate remained controlled.  Patient underwent ADRIAN cardioversion today without difficulty.  Monitored overnight at the request of Cardiology.    Review of Systems   Constitutional: Negative for chills, fatigue and fever.   Respiratory: Negative for cough and shortness of breath.    Cardiovascular: Negative for chest pain and leg swelling.   Gastrointestinal: Negative for abdominal pain, nausea and vomiting.   Musculoskeletal: Negative for back pain.   Skin: Positive for rash.   Neurological: Positive for weakness.   Psychiatric/Behavioral: Negative for confusion. The patient is not nervous/anxious.    All other systems reviewed and are negative.    Objective:     Vital Signs (Most Recent):  Temp: 97.4 °F (36.3 °C) (05/20/19 1941)  Pulse: 75 (05/20/19 1941)  Resp: 16 (05/20/19 1941)  BP: 114/73 (05/20/19 1941)  SpO2: 97 % (05/20/19 2010) Vital Signs (24h Range):  Temp:  [96.8 °F (36 °C)-98.5 °F (36.9 °C)] 97.4 °F (36.3 °C)  Pulse:  [] 75  Resp:  [10-27] 16  SpO2:  [91 %-100 %] 97 %  BP: (104-165)/() 114/73     Weight: 133.4 kg (294 lb)  Body mass index is 46.05 kg/m².    Intake/Output Summary (Last 24 hours) at 5/20/2019 2143  Last data filed at 5/20/2019 1900  Gross per 24 hour   Intake 360 ml   Output 800 ml   Net -440 ml      Physical Exam   Constitutional: He appears well-developed and well-nourished.   Obese  male in no acute distress   Eyes: Pupils are equal, round, and reactive to light. EOM are normal.   Neck: No JVD present.   Cardiovascular: Normal heart sounds and intact distal pulses.   Irregularly irregular rhythm, controlled rate.   Pulmonary/Chest: Effort normal and breath sounds normal.   Abdominal: Soft. Bowel sounds are normal. He exhibits no distension. There is no tenderness.   Musculoskeletal: He exhibits no edema or deformity.   Lymphadenopathy:      He has no cervical adenopathy.   Skin: No rash noted. No pallor.   Noted bilateral stasis dermatitis rash on the lower extremities   Nursing note and vitals reviewed.      Significant Labs: All pertinent labs within the past 24 hours have been reviewed.    Significant Imaging: I have reviewed all pertinent imaging results/findings within the past 24 hours.

## 2019-05-21 NOTE — PLAN OF CARE
05/21/19 1056   Final Note   Assessment Type Final Discharge Note   Anticipated Discharge Disposition Home   Hospital Follow Up  Appt(s) scheduled?   (spouse states she will schedule the appt)

## 2019-05-22 VITALS
HEART RATE: 67 BPM | SYSTOLIC BLOOD PRESSURE: 141 MMHG | RESPIRATION RATE: 20 BRPM | HEIGHT: 67 IN | WEIGHT: 283.06 LBS | DIASTOLIC BLOOD PRESSURE: 98 MMHG | TEMPERATURE: 97 F | BODY MASS INDEX: 44.43 KG/M2 | OXYGEN SATURATION: 99 %

## 2019-05-22 LAB
ALBUMIN SERPL BCP-MCNC: 3.5 G/DL (ref 3.5–5.2)
ALP SERPL-CCNC: 51 U/L (ref 55–135)
ALT SERPL W/O P-5'-P-CCNC: 36 U/L (ref 10–44)
ANION GAP SERPL CALC-SCNC: 7 MMOL/L (ref 8–16)
AST SERPL-CCNC: 38 U/L (ref 10–40)
BASOPHILS # BLD AUTO: 0 K/UL (ref 0–0.2)
BASOPHILS NFR BLD: 0.2 % (ref 0–1.9)
BILIRUB SERPL-MCNC: 0.9 MG/DL (ref 0.1–1)
BUN SERPL-MCNC: 16 MG/DL (ref 6–20)
CALCIUM SERPL-MCNC: 9.4 MG/DL (ref 8.7–10.5)
CHLORIDE SERPL-SCNC: 102 MMOL/L (ref 95–110)
CO2 SERPL-SCNC: 27 MMOL/L (ref 23–29)
CREAT SERPL-MCNC: 0.9 MG/DL (ref 0.5–1.4)
DIFFERENTIAL METHOD: ABNORMAL
EOSINOPHIL # BLD AUTO: 0.7 K/UL (ref 0–0.5)
EOSINOPHIL NFR BLD: 6.7 % (ref 0–8)
ERYTHROCYTE [DISTWIDTH] IN BLOOD BY AUTOMATED COUNT: 13 % (ref 11.5–14.5)
ERYTHROCYTE [DISTWIDTH] IN BLOOD BY AUTOMATED COUNT: 13 % (ref 11.5–14.5)
EST. GFR  (AFRICAN AMERICAN): >60 ML/MIN/1.73 M^2
EST. GFR  (NON AFRICAN AMERICAN): >60 ML/MIN/1.73 M^2
GLUCOSE SERPL-MCNC: 96 MG/DL (ref 70–110)
HCT VFR BLD AUTO: 48.2 % (ref 40–54)
HCT VFR BLD AUTO: 48.2 % (ref 40–54)
HGB BLD-MCNC: 16.2 G/DL (ref 14–18)
HGB BLD-MCNC: 16.2 G/DL (ref 14–18)
LYMPHOCYTES # BLD AUTO: 1.7 K/UL (ref 1–4.8)
LYMPHOCYTES NFR BLD: 15.1 % (ref 18–48)
MAGNESIUM SERPL-MCNC: 2.2 MG/DL (ref 1.6–2.6)
MCH RBC QN AUTO: 31.5 PG (ref 27–31)
MCH RBC QN AUTO: 31.5 PG (ref 27–31)
MCHC RBC AUTO-ENTMCNC: 33.5 G/DL (ref 32–36)
MCHC RBC AUTO-ENTMCNC: 33.5 G/DL (ref 32–36)
MCV RBC AUTO: 94 FL (ref 82–98)
MCV RBC AUTO: 94 FL (ref 82–98)
MONOCYTES # BLD AUTO: 1 K/UL (ref 0.3–1)
MONOCYTES NFR BLD: 8.7 % (ref 4–15)
NEUTROPHILS # BLD AUTO: 7.8 K/UL (ref 1.8–7.7)
NEUTROPHILS NFR BLD: 69.3 % (ref 38–73)
PHOSPHATE SERPL-MCNC: 4 MG/DL (ref 2.7–4.5)
PLATELET # BLD AUTO: 312 K/UL (ref 150–350)
PLATELET # BLD AUTO: 312 K/UL (ref 150–350)
PMV BLD AUTO: 8.7 FL (ref 9.2–12.9)
PMV BLD AUTO: 8.7 FL (ref 9.2–12.9)
POTASSIUM SERPL-SCNC: 4.4 MMOL/L (ref 3.5–5.1)
PROT SERPL-MCNC: 7.8 G/DL (ref 6–8.4)
RBC # BLD AUTO: 5.13 M/UL (ref 4.6–6.2)
RBC # BLD AUTO: 5.13 M/UL (ref 4.6–6.2)
SODIUM SERPL-SCNC: 136 MMOL/L (ref 136–145)
WBC # BLD AUTO: 11.2 K/UL (ref 3.9–12.7)
WBC # BLD AUTO: 11.2 K/UL (ref 3.9–12.7)

## 2019-05-22 PROCEDURE — 25000003 PHARM REV CODE 250: Performed by: INTERNAL MEDICINE

## 2019-05-22 PROCEDURE — 83735 ASSAY OF MAGNESIUM: CPT

## 2019-05-22 PROCEDURE — 94761 N-INVAS EAR/PLS OXIMETRY MLT: CPT

## 2019-05-22 PROCEDURE — 84100 ASSAY OF PHOSPHORUS: CPT

## 2019-05-22 PROCEDURE — 93005 ELECTROCARDIOGRAM TRACING: CPT

## 2019-05-22 PROCEDURE — 36415 COLL VENOUS BLD VENIPUNCTURE: CPT

## 2019-05-22 PROCEDURE — 25000003 PHARM REV CODE 250: Performed by: SPECIALIST

## 2019-05-22 PROCEDURE — 25000003 PHARM REV CODE 250: Performed by: HOSPITALIST

## 2019-05-22 PROCEDURE — 80053 COMPREHEN METABOLIC PANEL: CPT

## 2019-05-22 PROCEDURE — 85025 COMPLETE CBC W/AUTO DIFF WBC: CPT

## 2019-05-22 RX ADMIN — APIXABAN 5 MG: 2.5 TABLET, FILM COATED ORAL at 08:05

## 2019-05-22 RX ADMIN — ASPIRIN 325 MG ORAL TABLET 325 MG: 325 PILL ORAL at 08:05

## 2019-05-22 RX ADMIN — SOTALOL HYDROCHLORIDE 120 MG: 80 TABLET ORAL at 08:05

## 2019-05-22 RX ADMIN — LEVOTHYROXINE SODIUM 25 MCG: 25 TABLET ORAL at 05:05

## 2019-05-22 NOTE — NURSING
Called the pt's informed him that he need to take eliquis BID for 3 weeks and  mg only. Verbalized udnerstanding

## 2019-05-22 NOTE — PLAN OF CARE
05/22/19 0934   Final Note   Assessment Type Final Discharge Note   Anticipated Discharge Disposition Home

## 2019-05-22 NOTE — PLAN OF CARE
05/21/19 2129   Patient Assessment/Suction   Level of Consciousness (AVPU) alert   PRE-TX-O2   O2 Device (Oxygen Therapy) room air   SpO2 97 %   Pulse Oximetry Type Intermittent   $ Pulse Oximetry - Multiple Charge Pulse Oximetry - Multiple

## 2019-05-22 NOTE — PLAN OF CARE
Discharge paper reviewed and copy given to patient including the RX for EKG. Work excuse letter also provided Verbalized understanding  IV access removed and tele, no bleeding noted  Rx x 3 sent to pt's prefer pharmacy

## 2019-05-22 NOTE — PROGRESS NOTES
Progress Note  Cardiology    Admit Date: 5/17/2019   LOS: 5 days     Follow-up For:  AF; s/p cardioversion.    Scheduled Meds:   apixaban  5 mg Oral BID    aspirin  325 mg Oral Daily    levothyroxine  25 mcg Oral Before breakfast    sotalol  120 mg Oral BID     Continuous Infusions:  PRN Meds:sodium chloride 0.9%    Review of patient's allergies indicates:   Allergen Reactions    Ampicillin        SUBJECTIVE:     Interval History: Patient has no complaint of palp,cp or sob..    Review of Systems  Respiratory: negative for cough, hemoptysis, sputum and wheezing  Cardiovascular: negative for chest pressure/discomfort, orthopnea, palpitations and paroxysmal nocturnal dyspnea    OBJECTIVE:     Vital Signs (Most Recent)  Temp: 97.2 °F (36.2 °C) (05/22/19 1128)  Pulse: 67 (05/22/19 1128)  Resp: 20 (05/22/19 1128)  BP: (!) 141/98 (05/22/19 1128)  SpO2: 99 % (05/22/19 1128)    Vital Signs Range (Last 24H):  Temp:  [96.2 °F (35.7 °C)-98 °F (36.7 °C)]   Pulse:  [65-77]   Resp:  [18-20]   BP: (130-150)/(81-98)   SpO2:  [95 %-99 %]       Physical Exam:  Neck: no carotid bruit, no JVD and supple, symmetrical, trachea midline  Lungs: clear to auscultation bilaterally, normal respiratory effort  Heart: regular rate and rhythm, S1, S2 normal, no murmur, click, rub or gallop  Abdomen: soft, non-tender; bowel sounds normal; no masses,  no organomegaly  Extremities: Extremities normal, atraumatic, no cyanosis, clubbing, or edema    Recent Results (from the past 24 hour(s))   CBC auto differential    Collection Time: 05/22/19  5:03 AM   Result Value Ref Range    WBC 11.20 3.90 - 12.70 K/uL    RBC 5.13 4.60 - 6.20 M/uL    Hemoglobin 16.2 14.0 - 18.0 g/dL    Hematocrit 48.2 40.0 - 54.0 %    Mean Corpuscular Volume 94 82 - 98 fL    Mean Corpuscular Hemoglobin 31.5 (H) 27.0 - 31.0 pg    Mean Corpuscular Hemoglobin Conc 33.5 32.0 - 36.0 g/dL    RDW 13.0 11.5 - 14.5 %    Platelets 312 150 - 350 K/uL    MPV 8.7 (L) 9.2 - 12.9 fL    Gran  # (ANC) 7.8 (H) 1.8 - 7.7 K/uL    Lymph # 1.7 1.0 - 4.8 K/uL    Mono # 1.0 0.3 - 1.0 K/uL    Eos # 0.7 (H) 0.0 - 0.5 K/uL    Baso # 0.00 0.00 - 0.20 K/uL    Gran% 69.3 38.0 - 73.0 %    Lymph% 15.1 (L) 18.0 - 48.0 %    Mono% 8.7 4.0 - 15.0 %    Eosinophil% 6.7 0.0 - 8.0 %    Basophil% 0.2 0.0 - 1.9 %    Differential Method Automated    Comprehensive metabolic panel    Collection Time: 05/22/19  5:03 AM   Result Value Ref Range    Sodium 136 136 - 145 mmol/L    Potassium 4.4 3.5 - 5.1 mmol/L    Chloride 102 95 - 110 mmol/L    CO2 27 23 - 29 mmol/L    Glucose 96 70 - 110 mg/dL    BUN, Bld 16 6 - 20 mg/dL    Creatinine 0.9 0.5 - 1.4 mg/dL    Calcium 9.4 8.7 - 10.5 mg/dL    Total Protein 7.8 6.0 - 8.4 g/dL    Albumin 3.5 3.5 - 5.2 g/dL    Total Bilirubin 0.9 0.1 - 1.0 mg/dL    Alkaline Phosphatase 51 (L) 55 - 135 U/L    AST 38 10 - 40 U/L    ALT 36 10 - 44 U/L    Anion Gap 7 (L) 8 - 16 mmol/L    eGFR if African American >60 >60 mL/min/1.73 m^2    eGFR if non African American >60 >60 mL/min/1.73 m^2   Magnesium    Collection Time: 05/22/19  5:03 AM   Result Value Ref Range    Magnesium 2.2 1.6 - 2.6 mg/dL   Phosphorus    Collection Time: 05/22/19  5:03 AM   Result Value Ref Range    Phosphorus 4.0 2.7 - 4.5 mg/dL   CBC Without Differential    Collection Time: 05/22/19  5:03 AM   Result Value Ref Range    WBC 11.20 3.90 - 12.70 K/uL    RBC 5.13 4.60 - 6.20 M/uL    Hemoglobin 16.2 14.0 - 18.0 g/dL    Hematocrit 48.2 40.0 - 54.0 %    Mean Corpuscular Volume 94 82 - 98 fL    Mean Corpuscular Hemoglobin 31.5 (H) 27.0 - 31.0 pg    Mean Corpuscular Hemoglobin Conc 33.5 32.0 - 36.0 g/dL    RDW 13.0 11.5 - 14.5 %    Platelets 312 150 - 350 K/uL    MPV 8.7 (L) 9.2 - 12.9 fL       Diagnostic Results:  Labs: Reviewed  ECG: Reviewed    ASSESSMENT/PLAN:     Home today. Sotalol 120 mg q 12h. Eliquis bid for 3 weeks and then ASA only . [id number] - 1.

## 2019-05-23 ENCOUNTER — TELEPHONE (OUTPATIENT)
Dept: MEDSURG UNIT | Facility: HOSPITAL | Age: 44
End: 2019-05-23

## 2019-05-23 NOTE — DISCHARGE SUMMARY
[facility] Hospital Medicine  Discharge Summary      Patient Name: Bernard Tinajero  MRN: [id number]  Admission Date: 5/17/2019  Hospital Length of Stay: 5 days  Discharge Date and Time: 5/22/2019  3:00 PM  Attending Physician: No att. providers found   Discharging Provider: Rich Todd MD  Primary Care Provider: Stalin Enriquez Iv, MD      HPI:   No notes on file    Procedure(s) (LRB):  Cardioversion (N/A)      Hospital Course:    Patient is a 43-year-old  male with a past medical history significant for obesity, hypothyroidism, and hypertension who presents the hospital with new onset atrial fibrillation with rapid ventricular response.  Patient was started on Cardizem and transition to sotalol by Cardiology.  Echocardiogram showed no evidence of valvular abnormalities or other structural heart abnormalities.  Patient underwent ADRIAN cardioversion with success and was back in normal sinus rhythm at time of discharge.  His sotalol was up titrated by Cardiology in the patient was discharged home with sotalol and levothyroxine.     Consults:     No new Assessment & Plan notes have been filed under this hospital service since the last note was generated.  Service: Hospital Medicine    Final Active Diagnoses:    Diagnosis Date Noted POA    PRINCIPAL PROBLEM:  Atrial fibrillation with RVR [I48.91] 05/17/2019 Yes    Morbid obesity [E66.01] 05/17/2019 Yes    Hypothyroidism [E03.9] 05/17/2019 Yes    Patient's noncompliance with other medical treatment and regimen [Z91.19] 05/17/2019 Not Applicable    Essential hypertension [I10] 05/17/2019 Yes      Problems Resolved During this Admission:       Discharged Condition: stable    Disposition: Home or Self Care    Follow Up:  Follow-up Information     Stalin Enriquez Iv, MD.    Specialty:  Family Medicine  Why:  Spouse to schedule appt for patient, as discussed with Case Mgt  Contact information:  [address]   [place]  [id number]  [phone]                 Patient Instructions:      Diet Cardiac     Notify your health care provider if you experience any of the following:  temperature >100.4     Notify your health care provider if you experience any of the following:  difficulty breathing or increased cough     Notify your health care provider if you experience any of the following:  increased confusion or weakness     Activity as tolerated       Significant Diagnostic Studies:     Pending Diagnostic Studies:     Procedure Component Value Units Date/Time    EKG 12-LEAD [[id number]] Collected:  05/20/19 1218    Order Status:  Sent Lab Status:  In process Updated:  05/20/19 9374    Narrative:       Test Reason : I48.91    Vent. Rate : 082 BPM     Atrial Rate : 082 BPM     P-R Int : 162 ms          QRS Dur : 090 ms      QT Int : 392 ms       P-R-T Axes : 055 018 021 degrees     QTc Int : 457 ms    Normal sinus rhythm  Nonspecific T wave abnormality  Abnormal ECG  When compared with ECG of 17-MAY-2019 12:28,  Sinus rhythm has replaced Atrial fibrillation  Vent. rate has decreased  BPM  Questionable change in The axis    Referred By: [email or web address]   SELF           Confirmed By:     EKG 12-lead [[id number]]     Order Status:  Sent Lab Status:  No result          Medications:  Reconciled Home Medications:      Medication List      START taking these medications    apixaban 5 mg Tab  Commonly known as:  ELIQUIS  Take 1 tablet (5 mg total) by mouth 2 (two) times daily.     levothyroxine 25 MCG tablet  Commonly known as:  SYNTHROID  Take 1 tablet (25 mcg total) by mouth before breakfast.     sotalol 120 MG Tab  Commonly known as:  BETAPACE  Take 1 tablet (120 mg total) by mouth 2 (two) times daily.        STOP taking these medications    TRIAMTERENE-HYDROCHLOROTHIAZID ORAL            Indwelling Lines/Drains at time of discharge:   Lines/Drains/Airways          None          Time spent on the discharge of patient:   37 minutes  Patient was seen  and examined on the date of discharge and determined to be suitable for discharge.         Rich Todd MD  Department of Hospital Medicine  [facility]

## 2019-05-23 NOTE — HOSPITAL COURSE
Patient is a 43-year-old  male with a past medical history significant for obesity, hypothyroidism, and hypertension who presents the hospital with new onset atrial fibrillation with rapid ventricular response.  Patient was started on Cardizem and transition to sotalol by Cardiology.  Echocardiogram showed no evidence of valvular abnormalities or other structural heart abnormalities.  Patient underwent ADRIAN cardioversion with success and was back in normal sinus rhythm at time of discharge.  His sotalol was up titrated by Cardiology in the patient was discharged home with sotalol and levothyroxine.

## 2019-05-24 ENCOUNTER — HOSPITAL ENCOUNTER (OUTPATIENT)
Dept: CARDIOLOGY | Facility: HOSPITAL | Age: 44
Discharge: HOME | End: 2019-05-24
Attending: SPECIALIST
Payer: COMMERCIAL

## 2019-05-24 DIAGNOSIS — Z09 FOLLOW-UP EXAM: Primary | ICD-10-CM

## 2019-05-24 DIAGNOSIS — Z09 FOLLOW-UP EXAM: ICD-10-CM

## 2019-05-24 PROCEDURE — 93005 ELECTROCARDIOGRAM TRACING: CPT

## 2019-08-04 NOTE — PROGRESS NOTES
[facility] Medicine  Progress Note    Patient Name: Bernard Tinajero  MRN: [id number]  Patient Class: IP- Inpatient   Admission Date: 5/17/2019  Length of Stay: 4 days  Attending Physician: Rich Todd MD  Primary Care Provider: Stalin Enriquez Iv, MD        Subjective:     Principal Problem:Atrial fibrillation with RVR    HPI:  No notes on file    Hospital Course:  No notes on file    Interval History:  Patient seen and examined.  Patient continues to go in and out of atrial fibrillation.  Blood pressure and heart rate remained controlled.  Patient underwent ADRIAN cardioversion 5/20 without difficulty. Cardiology adjusting sotalol    Review of Systems   Constitutional: Negative for chills, fatigue and fever.   Respiratory: Negative for cough and shortness of breath.    Cardiovascular: Negative for chest pain and leg swelling.   Gastrointestinal: Negative for abdominal pain, nausea and vomiting.   Musculoskeletal: Negative for back pain.   Skin: Positive for rash.   Neurological: Positive for weakness.   Psychiatric/Behavioral: Negative for confusion. The patient is not nervous/anxious.    All other systems reviewed and are negative.    Objective:     Vital Signs (Most Recent):  Temp: 97.4 °F (36.3 °C) (05/20/19 1941)  Pulse: 75 (05/20/19 1941)  Resp: 16 (05/20/19 1941)  BP: 114/73 (05/20/19 1941)  SpO2: 97 % (05/20/19 2010) Vital Signs (24h Range):  Temp:  [96.8 °F (36 °C)-98.5 °F (36.9 °C)] 97.4 °F (36.3 °C)  Pulse:  [] 75  Resp:  [10-27] 16  SpO2:  [91 %-100 %] 97 %  BP: (104-165)/() 114/73     Weight: 133.4 kg (294 lb)  Body mass index is 46.05 kg/m².    Intake/Output Summary (Last 24 hours) at 5/20/2019 2143  Last data filed at 5/20/2019 1900  Gross per 24 hour   Intake 360 ml   Output 800 ml   Net -440 ml      Physical Exam   Constitutional: He appears well-developed and well-nourished.   Obese  male in no acute distress   Eyes: Pupils are equal, round, and  reactive to light. EOM are normal.   Neck: No JVD present.   Cardiovascular: Normal heart sounds and intact distal pulses.   Irregularly irregular rhythm, controlled rate.   Pulmonary/Chest: Effort normal and breath sounds normal.   Abdominal: Soft. Bowel sounds are normal. He exhibits no distension. There is no tenderness.   Musculoskeletal: He exhibits no edema or deformity.   Lymphadenopathy:     He has no cervical adenopathy.   Skin: No rash noted. No pallor.   Noted bilateral stasis dermatitis rash on the lower extremities   Nursing note and vitals reviewed.      Significant Labs: All pertinent labs within the past 24 hours have been reviewed.    Significant Imaging: I have reviewed all pertinent imaging results/findings within the past 24 hours.    Assessment/Plan:      * Atrial fibrillation with RVR  Atrial Fibrillation controlled currently with Beta Blocker and Calcium Channel Blocker. [id number] score 1. Anticoagulation indicated currently. Will continue patient on full-dose anticoagulation at the request of Cardiology.      Essential hypertension  Chronic, controlled.  Latest blood pressure and vitals reviewed-   Temp:  [96.8 °F (36 °C)-98.5 °F (36.9 °C)]   Pulse:  []   Resp:  [10-27]   BP: (104-165)/()   SpO2:  [91 %-100 %] .   Current meds for hypertension include  Sotalol- adjusted per cards.  Will continue BP medications as needed for sustained BP control.      Patient's noncompliance with other medical treatment and regimen  Medical compliance counseling performed.    Hypothyroidism  Patient admits prior history of hypothyroidism but never took the medication.  Start Synthroid at lower dose 25 mcg daily.  Avoiding higher does due to atrial fibrillation with rapid ventricular response.    Morbid obesity  Body mass index is 46.05 kg/m². Morbid obesity complicates all aspects of disease management from diagnostic modalities to treatment. Weight loss encouraged and health benefits explained to  patient.            VTE Risk Mitigation (From admission, onward)        Ordered     apixaban tablet 5 mg  2 times daily      05/20/19 0452              Rich Todd MD  Department of Hospital Medicine   [facility]

## (undated) DEVICE — PAD RADIOLUCENT STAT ADULT